# Patient Record
Sex: MALE | Race: WHITE | Employment: OTHER | URBAN - METROPOLITAN AREA
[De-identification: names, ages, dates, MRNs, and addresses within clinical notes are randomized per-mention and may not be internally consistent; named-entity substitution may affect disease eponyms.]

---

## 2017-05-03 RX ORDER — OMEPRAZOLE 20 MG/1
CAPSULE, DELAYED RELEASE ORAL
Qty: 90 CAPSULE | Refills: 3 | Status: SHIPPED | OUTPATIENT
Start: 2017-05-03 | End: 2018-05-18 | Stop reason: ALTCHOICE

## 2018-05-18 ENCOUNTER — OFFICE VISIT (OUTPATIENT)
Dept: PRIMARY CARE CLINIC | Age: 63
End: 2018-05-18
Payer: MEDICARE

## 2018-05-18 VITALS
SYSTOLIC BLOOD PRESSURE: 140 MMHG | RESPIRATION RATE: 17 BRPM | WEIGHT: 260 LBS | HEART RATE: 52 BPM | HEIGHT: 77 IN | OXYGEN SATURATION: 99 % | DIASTOLIC BLOOD PRESSURE: 80 MMHG | BODY MASS INDEX: 30.7 KG/M2

## 2018-05-18 DIAGNOSIS — R25.2 MUSCLE CRAMP: ICD-10-CM

## 2018-05-18 DIAGNOSIS — E78.00 PURE HYPERCHOLESTEROLEMIA: ICD-10-CM

## 2018-05-18 DIAGNOSIS — E55.9 VITAMIN D DEFICIENCY: ICD-10-CM

## 2018-05-18 DIAGNOSIS — I10 ESSENTIAL HYPERTENSION: Primary | ICD-10-CM

## 2018-05-18 DIAGNOSIS — R53.83 FATIGUE, UNSPECIFIED TYPE: ICD-10-CM

## 2018-05-18 DIAGNOSIS — R73.09 ABNORMAL BLOOD SUGAR: ICD-10-CM

## 2018-05-18 LAB — HBA1C MFR BLD: 5.8 %

## 2018-05-18 PROCEDURE — G8427 DOCREV CUR MEDS BY ELIG CLIN: HCPCS | Performed by: NURSE PRACTITIONER

## 2018-05-18 PROCEDURE — 3017F COLORECTAL CA SCREEN DOC REV: CPT | Performed by: NURSE PRACTITIONER

## 2018-05-18 PROCEDURE — 1036F TOBACCO NON-USER: CPT | Performed by: NURSE PRACTITIONER

## 2018-05-18 PROCEDURE — G8417 CALC BMI ABV UP PARAM F/U: HCPCS | Performed by: NURSE PRACTITIONER

## 2018-05-18 PROCEDURE — 83036 HEMOGLOBIN GLYCOSYLATED A1C: CPT | Performed by: NURSE PRACTITIONER

## 2018-05-18 PROCEDURE — 99215 OFFICE O/P EST HI 40 MIN: CPT | Performed by: NURSE PRACTITIONER

## 2018-05-18 RX ORDER — METOPROLOL SUCCINATE 50 MG/1
TABLET, EXTENDED RELEASE ORAL
Qty: 90 TABLET | Refills: 3 | Status: SHIPPED | OUTPATIENT
Start: 2018-05-18 | End: 2018-05-25 | Stop reason: CLARIF

## 2018-05-18 ASSESSMENT — ENCOUNTER SYMPTOMS
SHORTNESS OF BREATH: 0
ORTHOPNEA: 0
ABDOMINAL PAIN: 0
COUGH: 0
BACK PAIN: 1
BLURRED VISION: 0

## 2018-05-18 ASSESSMENT — PATIENT HEALTH QUESTIONNAIRE - PHQ9
1. LITTLE INTEREST OR PLEASURE IN DOING THINGS: 0
2. FEELING DOWN, DEPRESSED OR HOPELESS: 0
SUM OF ALL RESPONSES TO PHQ QUESTIONS 1-9: 0
SUM OF ALL RESPONSES TO PHQ9 QUESTIONS 1 & 2: 0

## 2018-05-21 ENCOUNTER — TELEPHONE (OUTPATIENT)
Dept: PRIMARY CARE CLINIC | Age: 63
End: 2018-05-21

## 2018-05-25 ENCOUNTER — OFFICE VISIT (OUTPATIENT)
Dept: PRIMARY CARE CLINIC | Age: 63
End: 2018-05-25
Payer: MEDICARE

## 2018-05-25 VITALS
SYSTOLIC BLOOD PRESSURE: 122 MMHG | RESPIRATION RATE: 17 BRPM | WEIGHT: 257.2 LBS | HEIGHT: 77 IN | OXYGEN SATURATION: 98 % | DIASTOLIC BLOOD PRESSURE: 74 MMHG | HEART RATE: 79 BPM | BODY MASS INDEX: 30.37 KG/M2

## 2018-05-25 DIAGNOSIS — I49.9 IRREGULAR HEART BEAT: ICD-10-CM

## 2018-05-25 DIAGNOSIS — I21.11 ST ELEVATION MYOCARDIAL INFARCTION INVOLVING RIGHT CORONARY ARTERY (HCC): Primary | ICD-10-CM

## 2018-05-25 PROCEDURE — 99214 OFFICE O/P EST MOD 30 MIN: CPT | Performed by: NURSE PRACTITIONER

## 2018-05-25 PROCEDURE — 3017F COLORECTAL CA SCREEN DOC REV: CPT | Performed by: NURSE PRACTITIONER

## 2018-05-25 PROCEDURE — 1036F TOBACCO NON-USER: CPT | Performed by: NURSE PRACTITIONER

## 2018-05-25 PROCEDURE — G8427 DOCREV CUR MEDS BY ELIG CLIN: HCPCS | Performed by: NURSE PRACTITIONER

## 2018-05-25 PROCEDURE — G8598 ASA/ANTIPLAT THER USED: HCPCS | Performed by: NURSE PRACTITIONER

## 2018-05-25 PROCEDURE — G8417 CALC BMI ABV UP PARAM F/U: HCPCS | Performed by: NURSE PRACTITIONER

## 2018-05-25 RX ORDER — ASPIRIN 81 MG/1
81 TABLET, CHEWABLE ORAL DAILY
COMMUNITY
End: 2019-09-05 | Stop reason: SDUPTHER

## 2018-05-25 RX ORDER — METOPROLOL SUCCINATE 100 MG/1
100 TABLET, EXTENDED RELEASE ORAL 2 TIMES DAILY
COMMUNITY
Start: 2018-05-22 | End: 2019-09-05 | Stop reason: SDUPTHER

## 2018-05-25 RX ORDER — TICAGRELOR 90 MG/1
90 TABLET ORAL 2 TIMES DAILY
COMMUNITY
Start: 2018-05-22 | End: 2019-09-05 | Stop reason: ALTCHOICE

## 2018-05-25 RX ORDER — ATORVASTATIN CALCIUM 80 MG/1
80 TABLET, FILM COATED ORAL DAILY
COMMUNITY
Start: 2018-05-22 | End: 2019-09-05 | Stop reason: SDUPTHER

## 2018-05-25 RX ORDER — LISINOPRIL 2.5 MG/1
2.5 TABLET ORAL DAILY
COMMUNITY
Start: 2018-05-22 | End: 2019-09-05 | Stop reason: ALTCHOICE

## 2018-05-25 ASSESSMENT — ENCOUNTER SYMPTOMS
BACK PAIN: 0
SHORTNESS OF BREATH: 0
COUGH: 0
ABDOMINAL PAIN: 0

## 2019-09-05 ENCOUNTER — OFFICE VISIT (OUTPATIENT)
Dept: PRIMARY CARE CLINIC | Age: 64
End: 2019-09-05
Payer: MEDICARE

## 2019-09-05 VITALS
DIASTOLIC BLOOD PRESSURE: 84 MMHG | HEIGHT: 76 IN | HEART RATE: 63 BPM | OXYGEN SATURATION: 97 % | SYSTOLIC BLOOD PRESSURE: 122 MMHG | BODY MASS INDEX: 33.83 KG/M2 | WEIGHT: 277.8 LBS | RESPIRATION RATE: 15 BRPM

## 2019-09-05 DIAGNOSIS — Z12.5 SCREENING FOR PROSTATE CANCER: ICD-10-CM

## 2019-09-05 DIAGNOSIS — Z13.29 SCREENING FOR THYROID DISORDER: ICD-10-CM

## 2019-09-05 DIAGNOSIS — Z13.0 SCREENING FOR DEFICIENCY ANEMIA: ICD-10-CM

## 2019-09-05 DIAGNOSIS — Z13.1 SCREENING FOR DIABETES MELLITUS: ICD-10-CM

## 2019-09-05 DIAGNOSIS — Z12.11 SCREEN FOR COLON CANCER: ICD-10-CM

## 2019-09-05 DIAGNOSIS — Z13.220 SCREENING FOR LIPID DISORDERS: ICD-10-CM

## 2019-09-05 DIAGNOSIS — R73.09 ELEVATED GLUCOSE: ICD-10-CM

## 2019-09-05 DIAGNOSIS — Z00.00 ENCOUNTER FOR GENERAL ADULT MEDICAL EXAMINATION W/O ABNORMAL FINDINGS: Primary | ICD-10-CM

## 2019-09-05 PROCEDURE — 3017F COLORECTAL CA SCREEN DOC REV: CPT | Performed by: NURSE PRACTITIONER

## 2019-09-05 PROCEDURE — G0438 PPPS, INITIAL VISIT: HCPCS | Performed by: NURSE PRACTITIONER

## 2019-09-05 RX ORDER — METOPROLOL SUCCINATE 100 MG/1
100 TABLET, EXTENDED RELEASE ORAL DAILY
Qty: 90 TABLET | Refills: 3 | Status: SHIPPED | OUTPATIENT
Start: 2019-09-05 | End: 2020-09-14

## 2019-09-05 RX ORDER — CLOPIDOGREL BISULFATE 75 MG/1
75 TABLET ORAL DAILY
Qty: 90 TABLET | Refills: 3 | Status: SHIPPED | OUTPATIENT
Start: 2019-09-05 | End: 2021-10-07 | Stop reason: SDUPTHER

## 2019-09-05 RX ORDER — ATORVASTATIN CALCIUM 80 MG/1
80 TABLET, FILM COATED ORAL DAILY
Qty: 90 TABLET | Refills: 3 | Status: SHIPPED | OUTPATIENT
Start: 2019-09-05 | End: 2020-09-29 | Stop reason: SDUPTHER

## 2019-09-05 RX ORDER — CLOPIDOGREL BISULFATE 75 MG/1
75 TABLET ORAL DAILY
COMMUNITY
End: 2019-09-05 | Stop reason: SDUPTHER

## 2019-09-05 RX ORDER — OMEPRAZOLE 40 MG/1
40 CAPSULE, DELAYED RELEASE ORAL
Qty: 30 CAPSULE | Refills: 5 | Status: SHIPPED | OUTPATIENT
Start: 2019-09-05 | End: 2020-09-29 | Stop reason: SDUPTHER

## 2019-09-05 RX ORDER — ASPIRIN 81 MG/1
81 TABLET, CHEWABLE ORAL DAILY
Qty: 90 TABLET | Refills: 3 | Status: SHIPPED | OUTPATIENT
Start: 2019-09-05

## 2019-09-05 ASSESSMENT — LIFESTYLE VARIABLES
HOW OFTEN DURING THE LAST YEAR HAVE YOU NEEDED AN ALCOHOLIC DRINK FIRST THING IN THE MORNING TO GET YOURSELF GOING AFTER A NIGHT OF HEAVY DRINKING: 0
HOW OFTEN DURING THE LAST YEAR HAVE YOU FAILED TO DO WHAT WAS NORMALLY EXPECTED FROM YOU BECAUSE OF DRINKING: 0
HOW OFTEN DO YOU HAVE SIX OR MORE DRINKS ON ONE OCCASION: 0
HAVE YOU OR SOMEONE ELSE BEEN INJURED AS A RESULT OF YOUR DRINKING: 0
HOW OFTEN DURING THE LAST YEAR HAVE YOU BEEN UNABLE TO REMEMBER WHAT HAPPENED THE NIGHT BEFORE BECAUSE YOU HAD BEEN DRINKING: 0
AUDIT-C TOTAL SCORE: 1
HOW OFTEN DURING THE LAST YEAR HAVE YOU HAD A FEELING OF GUILT OR REMORSE AFTER DRINKING: 0
HAS A RELATIVE, FRIEND, DOCTOR, OR ANOTHER HEALTH PROFESSIONAL EXPRESSED CONCERN ABOUT YOUR DRINKING OR SUGGESTED YOU CUT DOWN: 0
HOW MANY STANDARD DRINKS CONTAINING ALCOHOL DO YOU HAVE ON A TYPICAL DAY: 0
AUDIT TOTAL SCORE: 1
HOW OFTEN DURING THE LAST YEAR HAVE YOU FOUND THAT YOU WERE NOT ABLE TO STOP DRINKING ONCE YOU HAD STARTED: 0
HOW OFTEN DO YOU HAVE A DRINK CONTAINING ALCOHOL: 1

## 2019-09-05 ASSESSMENT — PATIENT HEALTH QUESTIONNAIRE - PHQ9
SUM OF ALL RESPONSES TO PHQ QUESTIONS 1-9: 0
SUM OF ALL RESPONSES TO PHQ QUESTIONS 1-9: 0

## 2019-09-05 ASSESSMENT — ANXIETY QUESTIONNAIRES: GAD7 TOTAL SCORE: 0

## 2019-09-18 ENCOUNTER — TELEPHONE (OUTPATIENT)
Dept: PRIMARY CARE CLINIC | Age: 64
End: 2019-09-18

## 2019-09-18 DIAGNOSIS — M54.42 CHRONIC MIDLINE LOW BACK PAIN WITH BILATERAL SCIATICA: Primary | ICD-10-CM

## 2019-09-18 DIAGNOSIS — G89.29 CHRONIC MIDLINE LOW BACK PAIN WITH BILATERAL SCIATICA: Primary | ICD-10-CM

## 2019-09-18 DIAGNOSIS — M54.41 CHRONIC MIDLINE LOW BACK PAIN WITH BILATERAL SCIATICA: Primary | ICD-10-CM

## 2020-07-19 LAB
BASOPHILS ABSOLUTE: 0.1 /ΜL
BASOPHILS RELATIVE PERCENT: 1 %
EOSINOPHILS ABSOLUTE: 0.2 /ΜL
EOSINOPHILS RELATIVE PERCENT: 2 %
FERRITIN: 25 NG/ML (ref 18–300)
HCT VFR BLD CALC: 36.5 % (ref 41–53)
HEMOGLOBIN: 12.7 G/DL (ref 13.5–17.5)
LYMPHOCYTES ABSOLUTE: 8.1 /ΜL
LYMPHOCYTES RELATIVE PERCENT: 60 %
MCH RBC QN AUTO: 31.3 PG
MCHC RBC AUTO-ENTMCNC: 34.9 G/DL
MCV RBC AUTO: 90 FL
MONOCYTES ABSOLUTE: 0.6 /ΜL
MONOCYTES RELATIVE PERCENT: 5 %
NEUTROPHILS ABSOLUTE: 4.4 /ΜL
NEUTROPHILS RELATIVE PERCENT: 33 %
PLATELET # BLD: 239 K/ΜL
PMV BLD AUTO: 7.9 FL
RBC # BLD: 4.07 10^6/ΜL
WBC # BLD: 13.4 10^3/ML

## 2020-09-14 RX ORDER — METOPROLOL SUCCINATE 100 MG/1
TABLET, EXTENDED RELEASE ORAL
Qty: 30 TABLET | Refills: 0 | Status: SHIPPED | OUTPATIENT
Start: 2020-09-14 | End: 2020-11-24

## 2020-09-29 RX ORDER — OMEPRAZOLE 40 MG/1
40 CAPSULE, DELAYED RELEASE ORAL
Qty: 30 CAPSULE | Refills: 0 | Status: SHIPPED | OUTPATIENT
Start: 2020-09-29 | End: 2020-10-29

## 2020-09-29 RX ORDER — ATORVASTATIN CALCIUM 80 MG/1
80 TABLET, FILM COATED ORAL DAILY
Qty: 30 TABLET | Refills: 0 | Status: SHIPPED | OUTPATIENT
Start: 2020-09-29 | End: 2020-10-29

## 2020-10-05 ENCOUNTER — OFFICE VISIT (OUTPATIENT)
Dept: PRIMARY CARE CLINIC | Age: 65
End: 2020-10-05
Payer: MEDICARE

## 2020-10-05 VITALS
HEIGHT: 77 IN | BODY MASS INDEX: 36.01 KG/M2 | SYSTOLIC BLOOD PRESSURE: 136 MMHG | WEIGHT: 305 LBS | DIASTOLIC BLOOD PRESSURE: 84 MMHG | OXYGEN SATURATION: 96 % | HEART RATE: 73 BPM | RESPIRATION RATE: 15 BRPM

## 2020-10-05 PROCEDURE — G0008 ADMIN INFLUENZA VIRUS VAC: HCPCS | Performed by: NURSE PRACTITIONER

## 2020-10-05 PROCEDURE — 90686 IIV4 VACC NO PRSV 0.5 ML IM: CPT | Performed by: NURSE PRACTITIONER

## 2020-10-05 PROCEDURE — G8482 FLU IMMUNIZE ORDER/ADMIN: HCPCS | Performed by: NURSE PRACTITIONER

## 2020-10-05 PROCEDURE — 3017F COLORECTAL CA SCREEN DOC REV: CPT | Performed by: NURSE PRACTITIONER

## 2020-10-05 PROCEDURE — G0439 PPPS, SUBSEQ VISIT: HCPCS | Performed by: NURSE PRACTITIONER

## 2020-10-05 ASSESSMENT — LIFESTYLE VARIABLES
HOW OFTEN DURING THE LAST YEAR HAVE YOU BEEN UNABLE TO REMEMBER WHAT HAPPENED THE NIGHT BEFORE BECAUSE YOU HAD BEEN DRINKING: 0
AUDIT TOTAL SCORE: 3
HOW OFTEN DURING THE LAST YEAR HAVE YOU HAD A FEELING OF GUILT OR REMORSE AFTER DRINKING: 0
HOW OFTEN DO YOU HAVE SIX OR MORE DRINKS ON ONE OCCASION: 0
HOW OFTEN DURING THE LAST YEAR HAVE YOU NEEDED AN ALCOHOLIC DRINK FIRST THING IN THE MORNING TO GET YOURSELF GOING AFTER A NIGHT OF HEAVY DRINKING: 0
HOW OFTEN DURING THE LAST YEAR HAVE YOU FAILED TO DO WHAT WAS NORMALLY EXPECTED FROM YOU BECAUSE OF DRINKING: 0
HAS A RELATIVE, FRIEND, DOCTOR, OR ANOTHER HEALTH PROFESSIONAL EXPRESSED CONCERN ABOUT YOUR DRINKING OR SUGGESTED YOU CUT DOWN: 0
HOW OFTEN DURING THE LAST YEAR HAVE YOU FOUND THAT YOU WERE NOT ABLE TO STOP DRINKING ONCE YOU HAD STARTED: 0
AUDIT-C TOTAL SCORE: 3
HOW MANY STANDARD DRINKS CONTAINING ALCOHOL DO YOU HAVE ON A TYPICAL DAY: 1
HOW OFTEN DO YOU HAVE A DRINK CONTAINING ALCOHOL: 2
HAVE YOU OR SOMEONE ELSE BEEN INJURED AS A RESULT OF YOUR DRINKING: 0

## 2020-10-05 ASSESSMENT — PATIENT HEALTH QUESTIONNAIRE - PHQ9
SUM OF ALL RESPONSES TO PHQ9 QUESTIONS 1 & 2: 0
2. FEELING DOWN, DEPRESSED OR HOPELESS: 0
SUM OF ALL RESPONSES TO PHQ QUESTIONS 1-9: 0
SUM OF ALL RESPONSES TO PHQ QUESTIONS 1-9: 0
1. LITTLE INTEREST OR PLEASURE IN DOING THINGS: 0

## 2020-10-05 NOTE — PROGRESS NOTES
Medicare Annual Wellness Visit  Name: Iam Perkins Date: 10/5/2020   MRN: [id number] Sex: Male   Age: 60 y.o. Ethnicity: Non-/Non    : 1955 Race: Joaquimnory Doyle is here for Medicare AWV    Screenings for behavioral, psychosocial and functional/safety risks, and cognitive dysfunction are all negative except as indicated below. These results, as well as other patient data from the [address] form, are documented in Flowsheets linked to this Encounter. No Known Allergies    Prior to Visit Medications    Medication Sig Taking? Authorizing Provider   atorvastatin (LIPITOR) 80 MG tablet Take 1 tablet by mouth daily Yes ANCA Reynolds CNP   omeprazole (PRILOSEC) 40 MG delayed release capsule Take 1 capsule by mouth every morning (before breakfast) Yes ANCA Reynolds CNP   rivaroxaban (XARELTO) 20 MG TABS tablet Take 1 tablet by mouth Daily with supper Yes ANCA Reynolds CNP   metoprolol succinate (TOPROL XL) 100 MG extended release tablet take 1 tablet by mouth once daily Yes ANCA Reynolds CNP   clopidogrel (PLAVIX) 75 MG tablet Take 1 tablet by mouth daily Yes ANCA Reynolds CNP   aspirin 81 MG chewable tablet Take 1 tablet by mouth daily Yes ANCA Reynolds CNP   Multiple Vitamins-Minerals (MULTIVITAMIN PO) Take 1 tablet by mouth daily.  Yes Historical Provider, MD       Past Medical History:   Diagnosis Date    Arthritis     Chronic back pain     Chronic pain     Colon polyps     Degenerative disk disease     Failure of spinal fusion     Fractures     Hx multiple fractures:  bilat ankles, Ribs x3, Nose, Collar bone, fingers, toes thumb    Hepatitis B     Hyperlipidemia     ON RX    Hypertension 2001    ON RX    Irregular heart beat 2012    Neuropathy     BILAT LEGS    Spinal stenosis        Past Surgical History:   Procedure Laterality Date    BACK SURGERY  2012    Decompressive laminectomy/ medial w/ medial fecetectomies    BACK SURGERY  1/7/11    Decompressive laminectomy/ lumbar fusion w/ grafts and hardware.  BACK SURGERY  8/3/09    Anterior approach L4-5 spinal fusion    BACK SURGERY  10/1/08    Hemilaminectomy , decompression, discectomy L4-5, S1    COLONOSCOPY  8/1/2014    resection of polyps    NERVE BLOCK  6-5-13    DURAMORPH EPIDURAL STEROID BLOCK  CELESTONE 6 MORPHINE 1.5 MG    TENDON MANIPULATION Left 1966    Tendon & laceration repair forearm    TONSILLECTOMY  1965       Family History   Problem Relation Age of Onset    Heart Attack Father         passed away of massive MI @ 40    Heart Disease Father 40        MI'S AND OPEN HEART    Cancer Mother         age 70, lung CA    Cancer Maternal Uncle         PROSTATE    Heart Disease Paternal Aunt         OPEN HEART    Cancer Maternal Grandmother     Heart Disease Paternal Grandfather 40        MI       CareTeam (Including outside providers/suppliers regularly involved in providing care):   Patient Care Team:  ANCA Hancock Ala, CNP as PCP - General (Nurse Practitioner)  ANCA Hancock Ala, CNP as PCP - [facility] Empaneled Provider  Iker Hull RN as Registered Nurse    Wt Readings from Last 3 Encounters:   10/05/20 (!) 305 lb (138.3 kg)   09/05/19 277 lb 12.8 oz (126 kg)   05/25/18 257 lb 3.2 oz (116.7 kg)     Vitals:    10/05/20 0905   BP: 136/84   Pulse: 73   Resp: 15   SpO2: 96%   Weight: (!) 305 lb (138.3 kg)   Height: 6' 5\" (1.956 m)     Body mass index is 36.17 kg/m². Based upon direct observation of the patient, evaluation of cognition reveals recent and remote memory intact.     General Appearance: alert and oriented to person, place and time, well developed and well- nourished, in no acute distress  Skin: warm and dry, no rash or erythema  Head: normocephalic and atraumatic  Eyes: pupils equal, round, and reactive to light, extraocular eye movements intact, conjunctivae normal  ENT: tympanic membrane, external ear and ear canal normal bilaterally, nose without deformity, nasal mucosa and turbinates normal without polyps  Neck: supple and non-tender without mass, no thyromegaly or thyroid nodules, no cervical lymphadenopathy  Pulmonary/Chest: clear to auscultation bilaterally- no wheezes, rales or rhonchi, normal air movement, no respiratory distress  Cardiovascular: normal rate, regular rhythm, normal S1 and S2, no murmurs, rubs, clicks, or gallops, distal pulses intact, no carotid bruits  Abdomen: soft, non-tender, non-distended, normal bowel sounds, no masses or organomegaly  Extremities: no cyanosis, clubbing or edema  Musculoskeletal: normal range of motion, no joint swelling, deformity or tenderness  Neurologic: reflexes normal and symmetric, no cranial nerve deficit, gait, coordination and speech normal    Patient's complete Health Risk Assessment and screening values have been reviewed and are found in Flowsheets. The following problems were reviewed today and where indicated follow up appointments were made and/or referrals ordered. Positive Risk Factor Screenings with Interventions:     General Health:  General  In general, how would you say your health is?: Fair  In the past 7 days, have you experienced any of the following? New or Increased Pain, New or Increased Fatigue, Loneliness, Social Isolation, Stress or Anger?: (!) New or Increased Pain  Do you get the social and emotional support that you need?: Yes  Do you have a Living Will?: (!) No  General Health Risk Interventions:  · No Living Will: patient declined    Health Habits/Nutrition:  Health Habits/Nutrition  Do you exercise for at least 20 minutes 2-3 times per week?: Yes  Have you lost any weight without trying in the past 3 months?: No  Do you eat fewer than 2 meals per day?: No  Have you seen a dentist within the past year?: (!) No  Body mass index is 36.17 kg/m².   Health Habits/Nutrition Interventions:  · Dental exam overdue: awv.    Diagnoses and all orders for this visit:    Encounter for general adult medical examination w/o abnormal findings    Need for influenza vaccination  -     INFLUENZA, QUADV, 3 YRS AND OLDER, IM PF, PREFILL SYR OR SDV, 0.5ML (KAREN Ayala)    Screening for diabetes mellitus  -     Comprehensive Metabolic Panel; Future    Screening for deficiency anemia  -     CBC; Future    Hyperlipidemia, unspecified hyperlipidemia type  -     Lipid Panel; Future    Screening for thyroid disorder  -     TSH with Reflex; Future    Screening for prostate cancer  -     Psa screening; Future    Current use of proton pump inhibitor  -     Magnesium; Future    Acute pain of right shoulder  -     XR SHOULDER RIGHT (MIN 2 VIEWS); Future    Epigastric pain  -     XR ABDOMEN (2 VIEWS);  Future        Presents for AWV  Has gained 28lb since last visit  Feels it is due to his chronic back pain and not being able to move as well  Saw Dr. Cisneros Forward in the past for neurosurgery before care home, declines referral for further eval at this time    C/o right shoulder pain x 3 months with mass to right shoulder  Denies mechanism of injury  Willing to update xray    C/o epigastric abdominal pain, worse over the last 3 months  Hx of hernia  Stabbing pain every morning  Taking prilosec without improvement, does not feel it is GERD    Updated flu vaccine  Willing to update annual labs    Denies any other problems/concerns  Follow up in six months for recheck

## 2020-10-22 ENCOUNTER — HOSPITAL ENCOUNTER (OUTPATIENT)
Age: 65
Discharge: HOME | End: 2020-10-22
Payer: MEDICARE

## 2020-10-22 ENCOUNTER — HOSPITAL ENCOUNTER (OUTPATIENT)
Dept: GENERAL RADIOLOGY | Age: 65
Discharge: HOME | End: 2020-10-24
Payer: MEDICARE

## 2020-10-22 ENCOUNTER — HOSPITAL ENCOUNTER (OUTPATIENT)
Age: 65
Discharge: HOME | End: 2020-10-24
Payer: MEDICARE

## 2020-10-22 LAB
ALBUMIN SERPL-MCNC: 4.5 G/DL (ref 3.5–5.2)
ALBUMIN/GLOBULIN RATIO: 1.7 (ref 1–2.5)
ALP BLD-CCNC: 100 U/L (ref 40–129)
ALT SERPL-CCNC: 16 U/L (ref 5–41)
ANION GAP SERPL CALCULATED.3IONS-SCNC: 14 MMOL/L (ref 9–17)
AST SERPL-CCNC: 19 U/L
BILIRUB SERPL-MCNC: 0.65 MG/DL (ref 0.3–1.2)
BUN BLDV-MCNC: 16 MG/DL (ref 8–23)
BUN/CREAT BLD: NORMAL (ref 9–20)
CALCIUM SERPL-MCNC: 9.3 MG/DL (ref 8.6–10.4)
CHLORIDE BLD-SCNC: 103 MMOL/L (ref 98–107)
CHOLESTEROL/HDL RATIO: 3.9
CHOLESTEROL: 168 MG/DL
CO2: 21 MMOL/L (ref 20–31)
CREAT SERPL-MCNC: 0.85 MG/DL (ref 0.7–1.2)
GFR AFRICAN AMERICAN: >60 ML/MIN
GFR NON-AFRICAN AMERICAN: >60 ML/MIN
GFR SERPL CREATININE-BSD FRML MDRD: NORMAL ML/MIN/{1.73_M2}
GFR SERPL CREATININE-BSD FRML MDRD: NORMAL ML/MIN/{1.73_M2}
GLUCOSE BLD-MCNC: 99 MG/DL (ref 70–99)
HCT VFR BLD CALC: 41.8 % (ref 40.7–50.3)
HDLC SERPL-MCNC: 43 MG/DL
HEMOGLOBIN: 13.9 G/DL (ref 13–17)
LDL CHOLESTEROL: 81 MG/DL (ref 0–130)
MAGNESIUM: 2.2 MG/DL (ref 1.6–2.6)
MCH RBC QN AUTO: 30.2 PG (ref 25.2–33.5)
MCHC RBC AUTO-ENTMCNC: 33.3 G/DL (ref 28.4–34.8)
MCV RBC AUTO: 90.7 FL (ref 82.6–102.9)
NRBC AUTOMATED: 0 PER 100 WBC
PDW BLD-RTO: 12.9 % (ref 11.8–14.4)
PLATELET # BLD: 247 K/UL (ref 138–453)
PMV BLD AUTO: 10.6 FL (ref 8.1–13.5)
POTASSIUM SERPL-SCNC: 4.5 MMOL/L (ref 3.7–5.3)
PROSTATE SPECIFIC ANTIGEN: 1.09 UG/L
RBC # BLD: 4.61 M/UL (ref 4.21–5.77)
SODIUM BLD-SCNC: 138 MMOL/L (ref 135–144)
TOTAL PROTEIN: 7.2 G/DL (ref 6.4–8.3)
TRIGL SERPL-MCNC: 219 MG/DL
TSH SERPL DL<=0.05 MIU/L-ACNC: 0.75 MIU/L (ref 0.3–5)
VLDLC SERPL CALC-MCNC: ABNORMAL MG/DL (ref 1–30)
WBC # BLD: 13.9 K/UL (ref 3.5–11.3)

## 2020-10-22 PROCEDURE — 80053 COMPREHEN METABOLIC PANEL: CPT

## 2020-10-22 PROCEDURE — 85027 COMPLETE CBC AUTOMATED: CPT

## 2020-10-22 PROCEDURE — 80061 LIPID PANEL: CPT

## 2020-10-22 PROCEDURE — 73030 X-RAY EXAM OF SHOULDER: CPT

## 2020-10-22 PROCEDURE — 74019 RADEX ABDOMEN 2 VIEWS: CPT

## 2020-10-22 PROCEDURE — 83735 ASSAY OF MAGNESIUM: CPT

## 2020-10-22 PROCEDURE — 36415 COLL VENOUS BLD VENIPUNCTURE: CPT

## 2020-10-22 PROCEDURE — 84443 ASSAY THYROID STIM HORMONE: CPT

## 2020-10-22 PROCEDURE — G0103 PSA SCREENING: HCPCS

## 2020-10-22 RX ORDER — ESOMEPRAZOLE MAGNESIUM 40 MG/1
40 CAPSULE, DELAYED RELEASE ORAL 2 TIMES DAILY
Qty: 28 CAPSULE | Refills: 0 | Status: SHIPPED | OUTPATIENT
Start: 2020-10-22 | End: 2021-03-19

## 2020-10-28 ENCOUNTER — TELEPHONE (OUTPATIENT)
Dept: PRIMARY CARE CLINIC | Age: 65
End: 2020-10-28

## 2020-10-28 NOTE — TELEPHONE ENCOUNTER
Outcome   Deniedtoday   Your request has been denied      Writer Waiting for letter from insurance to see what medications are covered.

## 2020-10-29 RX ORDER — OMEPRAZOLE 40 MG/1
CAPSULE, DELAYED RELEASE ORAL
Qty: 90 CAPSULE | Refills: 3 | Status: SHIPPED | OUTPATIENT
Start: 2020-10-29 | End: 2021-10-07 | Stop reason: SDUPTHER

## 2020-10-29 RX ORDER — RIVAROXABAN 20 MG/1
TABLET, FILM COATED ORAL
Qty: 30 TABLET | Refills: 0 | Status: SHIPPED | OUTPATIENT
Start: 2020-10-29 | End: 2020-12-10

## 2020-10-29 RX ORDER — ATORVASTATIN CALCIUM 80 MG/1
80 TABLET, FILM COATED ORAL DAILY
Qty: 90 TABLET | Refills: 3 | Status: SHIPPED | OUTPATIENT
Start: 2020-10-29 | End: 2021-10-07 | Stop reason: SDUPTHER

## 2020-11-04 ENCOUNTER — OFFICE VISIT (OUTPATIENT)
Dept: ORTHOPEDIC SURGERY | Age: 65
End: 2020-11-04
Payer: MEDICARE

## 2020-11-04 VITALS — BODY MASS INDEX: 35.42 KG/M2 | HEIGHT: 77 IN | WEIGHT: 300 LBS

## 2020-11-04 PROBLEM — M19.011 OSTEOARTHRITIS OF RIGHT ACROMIOCLAVICULAR JOINT: Status: ACTIVE | Noted: 2020-11-04

## 2020-11-04 PROCEDURE — G8417 CALC BMI ABV UP PARAM F/U: HCPCS | Performed by: ORTHOPAEDIC SURGERY

## 2020-11-04 PROCEDURE — G8427 DOCREV CUR MEDS BY ELIG CLIN: HCPCS | Performed by: ORTHOPAEDIC SURGERY

## 2020-11-04 PROCEDURE — 1036F TOBACCO NON-USER: CPT | Performed by: ORTHOPAEDIC SURGERY

## 2020-11-04 PROCEDURE — 99203 OFFICE O/P NEW LOW 30 MIN: CPT | Performed by: ORTHOPAEDIC SURGERY

## 2020-11-04 PROCEDURE — G8482 FLU IMMUNIZE ORDER/ADMIN: HCPCS | Performed by: ORTHOPAEDIC SURGERY

## 2020-11-04 PROCEDURE — 3017F COLORECTAL CA SCREEN DOC REV: CPT | Performed by: ORTHOPAEDIC SURGERY

## 2020-11-04 NOTE — LETTER
11/4/2020    Christina Holder, APRN - CNP  [address]  [place], [address] [facility]    RE: Antonino Cisneros    Dear Dr. Keira Richardson,    Thank you for allowing me to participate in the care of Mr. Rosalia Rock. I had the opportunity to evaluate the patient on 11/4/2020. Attached you will find my evaluation and recommendations. Thanks again for the confidence you have expressed in me by allowing my participation in the care of your patient. I will keep you apprised of further developments in the patients treatment course as it progresses. If I can be of further assistance in any fashion, please feel free to contact me at your convenience.     Sincerely,        Lobo Hathaway  Shoulder and Elbow Surgery

## 2020-11-04 NOTE — PROGRESS NOTES
Orthopedic Shoulder Encounter Note     Chief complaint: Right shoulder pain    HPI: Yo Lechuga is a 63 y.o.  right-hand dominant male who presents for evaluation of his right shoulder. He has been having pain on and off over the course of several years. 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 Over the past 2 months he has had fairly constant aggravating pain in his shoulder which he describes as an ache. He states that is unable to put his arm behind his back or reach away from his body without having significant pain. Recently is also noticed a lump over the superior aspect of the shoulder. His pain is otherwise diffuse about the shoulder. He denies having any associated weakness or stiffness. He has not attempted any treatment for this. Previous treatment:    NSAIDs: None    Physical Therapy:  No    Injections: None    Surgeries: None    Review of Systems:     Constitution: no fever or chills   Pain level: 6/10  Musculoskeletal: As noted in the HPI   Neurologic: no neurologic symptoms    Past Medical History  Fernie Domínguez  has a past medical history of Arthritis, Chronic back pain, Chronic pain, Colon polyps, Degenerative disk disease, Failure of spinal fusion, Fractures, Hepatitis B, Hyperlipidemia, Hypertension, Irregular heart beat, Neuropathy, and Spinal stenosis. Past Surgical History  Fernie Domínguez  has a past surgical history that includes Tonsillectomy (1965); tendon manipulation (Left, 1966); Nerve Block (6-5-13); back surgery (7/2012); back surgery (1/7/11); back surgery (8/3/09); back surgery (10/1/08); and Colonoscopy (8/1/2014).     Current Medications  Current Outpatient Medications   Medication Sig Dispense Refill    atorvastatin (LIPITOR) 80 MG tablet TAKE 1 TABLET BY MOUTH DAILY 90 tablet 3    omeprazole (PRILOSEC) 40 MG delayed release capsule TAKE 1 CAPSULE BY MOUTH EVERY MORNING BEFORE BREAKFAST 90 capsule 3    XARELTO 20 MG TABS tablet TAKE 1 TABLET BY MOUTH DAILY WITH SUPPER 30 tablet 0    dexlansoprazole (DEXILANT) 30 MG CPDR delayed release capsule Take 30 mg by mouth daily 90 capsule 1    esomeprazole (NEXIUM) 40 MG delayed release capsule Take 1 capsule by mouth 2 times daily for 14 days 28 capsule 0    metoprolol succinate (TOPROL XL) 100 MG extended release tablet take 1 tablet by mouth once daily 30 tablet 0    clopidogrel (PLAVIX) 75 MG tablet Take 1 tablet by mouth daily 90 tablet 3    aspirin 81 MG chewable tablet Take 1 tablet by mouth daily 90 tablet 3    Multiple Vitamins-Minerals (MULTIVITAMIN PO) Take 1 tablet by mouth daily. No current facility-administered medications for this visit. Allergies  Allergies have been reviewed. Gabriela Husain has No Known Allergies. Social History  Gabriela Husain  reports that he has never smoked. He has never used smokeless tobacco. He reports current alcohol use of about 6.0 standard drinks of alcohol per week. He reports that he does not use drugs. Family History  Tye's family history includes Cancer in his maternal grandmother, maternal uncle, and mother; Heart Attack in his father; Heart Disease in his paternal aunt; Heart Disease (age of onset: 40) in his father and paternal grandfather. Physical Exam:     Ht 6' 5\" (1.956 m)   Wt 300 lb (136.1 kg)   BMI 35.57 kg/m²    General Appearance: alert, well appearing, and in no distress  Mental Status: alert, oriented to person, place, and time  Gait: normal    Shoulder:    Skin: warm and dry, no rash or erythema; no swelling or obvious muscular atrophy. Round mass over the superior aspect of the right shoulder at about the level of the acromioclavicular joint. It is relatively firm but mobile.   It is nonpulsatile  Vasculature: 2+ radial pulses bilaterally  Neuro: Sensation grossly intact to light touch diffusely  Tenderness: Tender to palpation over the acromioclavicular joint and anterior aspect of the right shoulder    ROM: (Degrees)    Right   A P   Left   A P    Elevation  135 150   Elevation  150   Abduction  150 155   Abduction  155    ER   50 80   ER   55   IR   L4    IR   T12   90 abd/ER      90 abd/ER     90 abd/IR      90 abd/IR     Crepitation  No    Crepitation No  Dyskenesia  No    Dyskenesia No      Muscle strength:    Right       Left    Deltoid   5    Deltoid   5  Supraspinatus  5    Supraspinatus  5  ER   5    ER   5  IR   5    IR   5    Special tests    Right   Rotator Cuff    Left    y   Painful arc    n   n   Pain with ER    n    n   Neer's     n    n   Hawkin's    n    n   Drop Arm    n  n   Lift off/Belly Press   n  n   ER Lag    n          AC Joint  y   AC tenderness   n  y   Cross-chest adduction  n       Labrum/biceps    y (equivocal)  Wyandot's    n   n   Biceps sheer    n      y   Speed's/Yergason's   n    y   Tenderness Biceps Groove  n    n   Foreign's    n         Instability  n   Ant Apprehension   n    n   Post Apprehension   n    n   Ant Load shift    n    n   Post Load shift   n   n   Sulcus     n  n   Generalized Laxity   n  n   Relocation test   n  n   Crank test     n  n   Ron-superior escape  n       Imaging:  Xrays: 4 views of the right shoulder obtained on 11/4/2020 were independently reviewed  Indications: Right shoulder pain  Findings: Normal glenohumeral joint space. Significant narrowing of the acromioclavicular joint associated with osteophytic change. Type II acromion. No obvious fracture, dislocation, or subluxation. 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 old male with right shoulder pain likely mostly due to his significant degeneration at the acromioclavicular joint. He has an associated ganglion cyst emanating from this joint.   I had a discussion educating him about this problem and we discussed treatment options including nonoperative and

## 2020-11-16 ENCOUNTER — OFFICE VISIT (OUTPATIENT)
Dept: ORTHOPEDIC SURGERY | Age: 65
End: 2020-11-16
Payer: MEDICARE

## 2020-11-16 VITALS
HEART RATE: 77 BPM | WEIGHT: 300 LBS | HEIGHT: 77 IN | DIASTOLIC BLOOD PRESSURE: 87 MMHG | BODY MASS INDEX: 35.42 KG/M2 | TEMPERATURE: 97.3 F | SYSTOLIC BLOOD PRESSURE: 144 MMHG

## 2020-11-16 PROCEDURE — 3017F COLORECTAL CA SCREEN DOC REV: CPT | Performed by: FAMILY MEDICINE

## 2020-11-16 PROCEDURE — 99203 OFFICE O/P NEW LOW 30 MIN: CPT | Performed by: FAMILY MEDICINE

## 2020-11-16 PROCEDURE — 20610 DRAIN/INJ JOINT/BURSA W/O US: CPT | Performed by: FAMILY MEDICINE

## 2020-11-16 PROCEDURE — 1036F TOBACCO NON-USER: CPT | Performed by: FAMILY MEDICINE

## 2020-11-16 PROCEDURE — G8482 FLU IMMUNIZE ORDER/ADMIN: HCPCS | Performed by: FAMILY MEDICINE

## 2020-11-16 PROCEDURE — G8427 DOCREV CUR MEDS BY ELIG CLIN: HCPCS | Performed by: FAMILY MEDICINE

## 2020-11-16 PROCEDURE — G8417 CALC BMI ABV UP PARAM F/U: HCPCS | Performed by: FAMILY MEDICINE

## 2020-11-16 RX ORDER — TRIAMCINOLONE ACETONIDE 40 MG/ML
40 INJECTION, SUSPENSION INTRA-ARTICULAR; INTRAMUSCULAR ONCE
Status: COMPLETED | OUTPATIENT
Start: 2020-11-16 | End: 2020-11-16

## 2020-11-16 RX ORDER — BUPIVACAINE HYDROCHLORIDE 5 MG/ML
1 INJECTION, SOLUTION PERINEURAL ONCE
Status: COMPLETED | OUTPATIENT
Start: 2020-11-16 | End: 2020-11-16

## 2020-11-16 RX ADMIN — TRIAMCINOLONE ACETONIDE 40 MG: 40 INJECTION, SUSPENSION INTRA-ARTICULAR; INTRAMUSCULAR at 10:32

## 2020-11-16 RX ADMIN — BUPIVACAINE HYDROCHLORIDE 5 MG: 5 INJECTION, SOLUTION PERINEURAL at 10:32

## 2020-11-16 NOTE — PROGRESS NOTES
Sports Medicine Consultation    CHIEF COMPLAINT:  Shoulder Pain (Rt shoulder. no injury. years of pain worse over last couple months.)        HPI:   The patient is a 63 y.o. male who is being seen as a new patient being seen for regarding new problem r shoulder pain. The patient is a right hand dominant male who has had shoulder pain for months. As far as trauma to the shoulder, the patient indicates no one specific trauma. The pain is  worse at night and when doing overhead activities. Weakness of the shoulder has  been noted. The pain restricts activities such as just hurts. The pain does not seem to improve with time. The following medications have been tried: nothing without benefit. Physical Therapy has not been tried. Corticosteroid injection has not been done. Neck pain has not been present. he has a past medical history of Arthritis, Chronic back pain, Chronic pain, Colon polyps, Degenerative disk disease, Failure of spinal fusion, Fractures, Hepatitis B, Hyperlipidemia, Hypertension, Irregular heart beat, Neuropathy, and Spinal stenosis. he has a past surgical history that includes Tonsillectomy (1965); tendon manipulation (Left, 1966); Nerve Block (6-5-13); back surgery (7/2012); back surgery (1/7/11); back surgery (8/3/09); back surgery (10/1/08); and Colonoscopy (8/1/2014).     Past Medical History:   Diagnosis Date    Arthritis     Chronic back pain     Chronic pain     Colon polyps     Degenerative disk disease     Failure of spinal fusion     Fractures     Hx multiple fractures:  bilat ankles, Ribs x3, Nose, Collar bone, fingers, toes thumb    Hepatitis B     Hyperlipidemia     ON RX    Hypertension 2001    ON RX    Irregular heart beat 2012    Neuropathy     BILAT LEGS    Spinal stenosis        Past Surgical History:   Procedure Laterality Date    BACK SURGERY  7/2012    Decompressive laminectomy/ medial w/ medial fecetectomies    BACK SURGERY  1/7/11 Decompressive laminectomy/ lumbar fusion w/ grafts and hardware.  BACK SURGERY  8/3/09    Anterior approach L4-5 spinal fusion    BACK SURGERY  10/1/08    Hemilaminectomy , decompression, discectomy L4-5, S1    COLONOSCOPY  8/1/2014    resection of polyps    NERVE BLOCK  6-5-13    DURAMORPH EPIDURAL STEROID BLOCK  CELESTONE 6 MORPHINE 1.5 MG    TENDON MANIPULATION Left 1966    Tendon & laceration repair forearm    TONSILLECTOMY  1965       family history includes Cancer in his maternal grandmother, maternal uncle, and mother; Heart Attack in his father; Heart Disease in his paternal aunt; Heart Disease (age of onset: 40) in his father and paternal grandfather. Social History     Socioeconomic History    Marital status: Single     Spouse name: Not on file    Number of children: Not on file    Years of education: Not on file    Highest education level: Not on file   Occupational History    Not on file   Social Needs    Financial resource strain: Not on file    Food insecurity     Worry: Not on file     Inability: Not on file    Transportation needs     Medical: Not on file     Non-medical: Not on file   Tobacco Use    Smoking status: Never Smoker    Smokeless tobacco: Never Used   Substance and Sexual Activity    Alcohol use:  Yes     Alcohol/week: 6.0 standard drinks     Types: 6 Cans of beer per week     Comment: usually weekends    Drug use: No    Sexual activity: Not on file   Lifestyle    Physical activity     Days per week: Not on file     Minutes per session: Not on file    Stress: Not on file   Relationships    Social connections     Talks on phone: Not on file     Gets together: Not on file     Attends [employer] service: Not on file     Active member of club or organization: Not on file     Attends meetings of clubs or organizations: Not on file     Relationship status: Not on file    Intimate partner violence     Fear of current or ex partner: Not on file     Emotionally abused: Not on file     Physically abused: Not on file     Forced sexual activity: Not on file   Other Topics Concern    Not on file   Social History Narrative    Not on file       Current Outpatient Medications   Medication Sig Dispense Refill    atorvastatin (LIPITOR) 80 MG tablet TAKE 1 TABLET BY MOUTH DAILY 90 tablet 3    omeprazole (PRILOSEC) 40 MG delayed release capsule TAKE 1 CAPSULE BY MOUTH EVERY MORNING BEFORE BREAKFAST 90 capsule 3    XARELTO 20 MG TABS tablet TAKE 1 TABLET BY MOUTH DAILY WITH SUPPER 30 tablet 0    dexlansoprazole (DEXILANT) 30 MG CPDR delayed release capsule Take 30 mg by mouth daily 90 capsule 1    esomeprazole (NEXIUM) 40 MG delayed release capsule Take 1 capsule by mouth 2 times daily for 14 days 28 capsule 0    metoprolol succinate (TOPROL XL) 100 MG extended release tablet take 1 tablet by mouth once daily 30 tablet 0    clopidogrel (PLAVIX) 75 MG tablet Take 1 tablet by mouth daily 90 tablet 3    aspirin 81 MG chewable tablet Take 1 tablet by mouth daily 90 tablet 3    Multiple Vitamins-Minerals (MULTIVITAMIN PO) Take 1 tablet by mouth daily. Current Facility-Administered Medications   Medication Dose Route Frequency Provider Last Rate Last Dose    bupivacaine (MARCAINE) 0.5 % injection 5 mg  1 mL Intra-articular Once Elane Lax, DO        triamcinolone acetonide (KENALOG-40) injection 40 mg  40 mg Intra-articular Once Elane Lax, DO             Allergies:  hehas No Known Allergies. ROS:  CV:  Denies chest pain; palpitations; shortness of breath; swelling of feet, ankles; and loss of consciousness. CON: Denies fever and dizziness. ENT:  Denies hearing loss / ringing, ear infections hoarseness, and swallowing problems. RESP:  Denies chronic cough, spitting up blood, and asthma/wheezing. GI: Denies abdominal pain, change in bowel habits, nausea or vomiting, and blood in stools.   :  Denies frequent urination, burning or painful urination, blood in the urine, anti-inflammatory medications, physical therapy, injections, further imaging studies and as a last resort surgery. At this point I do think the patient would benefit from attempted aspiration of this ganglion cyst would help with the acromioclavicular joint and after RBP was discussed verbal consent was obtained in the right shoulder acromioclavicular joint was visualized in the ultrasound with image capture to be uploaded into the electronic medical record I then introduced an 18-gauge needle into the cystic structure and after needle confirmation of placement I aspirated 2 mL of thick serosanguineous fluid from that cyst I then switched out the syringe redirected the needle into the acromioclavicular joint with image capture of needle placement and visualization of vascular structures injected 1 mL 40 mg Kenalog and 1 mL 0.5% Marcaine into the acromioclavicular joint patient tolerated procedure well felt immediate pain relief follow-up prn    Return to clinic No follow-ups on file. Jimmie Rodriguez     Please be aware portions of this note were completed using voice recognition software and unforeseen errors may have occurred    Electronically signed by La Malik DO, FAOASM on 11/16/20 at 10:04 AM EST

## 2020-11-19 ENCOUNTER — TELEPHONE (OUTPATIENT)
Dept: ORTHOPEDIC SURGERY | Age: 65
End: 2020-11-19

## 2020-11-20 ENCOUNTER — TELEPHONE (OUTPATIENT)
Dept: ORTHOPEDIC SURGERY | Age: 65
End: 2020-11-20

## 2020-11-20 NOTE — TELEPHONE ENCOUNTER
I witnessed Dr Tate Mane discussion with patient on 2020 . The patient was advised he received an  medication and was provided an opportunity to ask questions.

## 2020-11-24 RX ORDER — METOPROLOL SUCCINATE 100 MG/1
TABLET, EXTENDED RELEASE ORAL
Qty: 30 TABLET | Refills: 0 | Status: SHIPPED | OUTPATIENT
Start: 2020-11-24 | End: 2021-01-22

## 2020-12-10 RX ORDER — RIVAROXABAN 20 MG/1
TABLET, FILM COATED ORAL
Qty: 30 TABLET | Refills: 0 | Status: SHIPPED | OUTPATIENT
Start: 2020-12-10 | End: 2021-03-15 | Stop reason: SDUPTHER

## 2020-12-10 NOTE — TELEPHONE ENCOUNTER
LOV 10/05/2020    Next appt 4/5/2021    Health Maintenance   Topic Date Due    Hepatitis C screen  1955    HIV screen  10/16/1972    DTaP/Tdap/Td vaccine (1 - Tdap) 10/16/1976    Shingles Vaccine (1 of 2) 10/16/2007    A1C test (Diabetic or Prediabetic)  05/18/2019    Annual Wellness Visit (AWV)  10/06/2021    Lipid screen  10/22/2021    Colon cancer screen colonoscopy  08/01/2024    Flu vaccine  Completed    Hepatitis A vaccine  Aged Out    Hepatitis B vaccine  Aged Out    Hib vaccine  Aged Out    Meningococcal (ACWY) vaccine  Aged Out    Pneumococcal 0-64 years Vaccine  Aged Out             (applicable per patient's age: Cancer Screenings, Depression Screening, Fall Risk Screening, Immunizations)    Hemoglobin A1C (%)   Date Value   05/18/2018 5.8   01/31/2014 5.7     LDL Cholesterol (mg/dL)   Date Value   10/22/2020 81     AST (U/L)   Date Value   10/22/2020 19     ALT (U/L)   Date Value   10/22/2020 16     BUN (mg/dL)   Date Value   10/22/2020 16      (goal A1C is < 7)   (goal LDL is <100) need 30-50% reduction from baseline     BP Readings from Last 3 Encounters:   11/16/20 (!) 144/87   10/05/20 136/84   09/05/19 122/84    (goal /80)      All Future Testing planned in [facility]:  Lab Frequency Next Occurrence   [facility] COLONOSCOPY Once 10/05/2019       Next Visit Date:  Future Appointments   Date Time Provider Queenie Schmitz   4/5/2021  9:20 AM ANCA Hernandez - CNP [facility] [address] [facility]            Patient Active Problem List:     Chronic back pain     Hepatitis B     Degenerative disk disease     Arthritis     Chronic pain     Spinal stenosis     Fractures     Failure of spinal fusion     Irregular heart beat     Hyperlipidemia     Hypertension     Neuropathy     Neurogenic claudication due to lumbar spinal stenosis     Osteoarthritis of right acromioclavicular joint

## 2021-01-22 RX ORDER — METOPROLOL SUCCINATE 100 MG/1
TABLET, EXTENDED RELEASE ORAL
Qty: 30 TABLET | Refills: 0 | Status: SHIPPED | OUTPATIENT
Start: 2021-01-22 | End: 2021-03-25

## 2021-03-19 ENCOUNTER — OFFICE VISIT (OUTPATIENT)
Dept: ORTHOPEDIC SURGERY | Age: 66
End: 2021-03-19
Payer: MEDICARE

## 2021-03-19 VITALS
DIASTOLIC BLOOD PRESSURE: 88 MMHG | SYSTOLIC BLOOD PRESSURE: 135 MMHG | HEART RATE: 68 BPM | BODY MASS INDEX: 36.37 KG/M2 | HEIGHT: 77 IN | WEIGHT: 308 LBS

## 2021-03-19 DIAGNOSIS — M19.011 OSTEOARTHRITIS OF RIGHT ACROMIOCLAVICULAR JOINT: Primary | ICD-10-CM

## 2021-03-19 PROCEDURE — 3017F COLORECTAL CA SCREEN DOC REV: CPT | Performed by: FAMILY MEDICINE

## 2021-03-19 PROCEDURE — 1036F TOBACCO NON-USER: CPT | Performed by: FAMILY MEDICINE

## 2021-03-19 PROCEDURE — G8482 FLU IMMUNIZE ORDER/ADMIN: HCPCS | Performed by: FAMILY MEDICINE

## 2021-03-19 PROCEDURE — G8417 CALC BMI ABV UP PARAM F/U: HCPCS | Performed by: FAMILY MEDICINE

## 2021-03-19 PROCEDURE — 20611 DRAIN/INJ JOINT/BURSA W/US: CPT | Performed by: FAMILY MEDICINE

## 2021-03-19 PROCEDURE — 99213 OFFICE O/P EST LOW 20 MIN: CPT | Performed by: FAMILY MEDICINE

## 2021-03-19 PROCEDURE — G8427 DOCREV CUR MEDS BY ELIG CLIN: HCPCS | Performed by: FAMILY MEDICINE

## 2021-03-19 RX ORDER — TRIAMCINOLONE ACETONIDE 40 MG/ML
40 INJECTION, SUSPENSION INTRA-ARTICULAR; INTRAMUSCULAR ONCE
Status: COMPLETED | OUTPATIENT
Start: 2021-03-19 | End: 2021-03-19

## 2021-03-19 RX ADMIN — TRIAMCINOLONE ACETONIDE 40 MG: 40 INJECTION, SUSPENSION INTRA-ARTICULAR; INTRAMUSCULAR at 11:34

## 2021-03-19 NOTE — PROGRESS NOTES
w/ grafts and hardware.  BACK SURGERY  8/3/09    Anterior approach L4-5 spinal fusion    BACK SURGERY  10/1/08    Hemilaminectomy , decompression, discectomy L4-5, S1    COLONOSCOPY  8/1/2014    resection of polyps    NERVE BLOCK  6-5-13    DURAMORPH EPIDURAL STEROID BLOCK  CELESTONE 6 MORPHINE 1.5 MG    TENDON MANIPULATION Left 1966    Tendon & laceration repair forearm    TONSILLECTOMY  1965       family history includes Cancer in his maternal grandmother, maternal uncle, and mother; Heart Attack in his father; Heart Disease in his paternal aunt; Heart Disease (age of onset: 40) in his father and paternal grandfather. Social History     Socioeconomic History    Marital status: Single     Spouse name: Not on file    Number of children: Not on file    Years of education: Not on file    Highest education level: Not on file   Occupational History    Not on file   Social Needs    Financial resource strain: Not on file    Food insecurity     Worry: Not on file     Inability: Not on file    Transportation needs     Medical: Not on file     Non-medical: Not on file   Tobacco Use    Smoking status: Never Smoker    Smokeless tobacco: Never Used   Substance and Sexual Activity    Alcohol use:  Yes     Alcohol/week: 6.0 standard drinks     Types: 6 Cans of beer per week     Comment: usually weekends    Drug use: No    Sexual activity: Not on file   Lifestyle    Physical activity     Days per week: Not on file     Minutes per session: Not on file    Stress: Not on file   Relationships    Social connections     Talks on phone: Not on file     Gets together: Not on file     Attends Muslim service: Not on file     Active member of club or organization: Not on file     Attends meetings of clubs or organizations: Not on file     Relationship status: Not on file    Intimate partner violence     Fear of current or ex partner: Not on file     Emotionally abused: Not on file     Physically abused: Not on file     Forced sexual activity: Not on file   Other Topics Concern    Not on file   Social History Narrative    Not on file       Current Outpatient Medications   Medication Sig Dispense Refill    rivaroxaban (XARELTO) 20 MG TABS tablet TAKE 1 TABLET BY MOUTH DAILY WITH SUPPER 30 tablet 0    metoprolol succinate (TOPROL XL) 100 MG extended release tablet take 1 tablet by mouth once daily 30 tablet 0    atorvastatin (LIPITOR) 80 MG tablet TAKE 1 TABLET BY MOUTH DAILY 90 tablet 3    omeprazole (PRILOSEC) 40 MG delayed release capsule TAKE 1 CAPSULE BY MOUTH EVERY MORNING BEFORE BREAKFAST (Patient taking differently: PRN) 90 capsule 3    clopidogrel (PLAVIX) 75 MG tablet Take 1 tablet by mouth daily 90 tablet 3    aspirin 81 MG chewable tablet Take 1 tablet by mouth daily 90 tablet 3    Multiple Vitamins-Minerals (MULTIVITAMIN PO) Take 1 tablet by mouth daily. No current facility-administered medications for this visit. Allergies:  hehas No Known Allergies. ROS:  CV:  Denies chest pain; palpitations; shortness of breath; swelling of feet, ankles; and loss of consciousness. CON: Denies fever and dizziness. ENT:  Denies hearing loss / ringing, ear infections hoarseness, and swallowing problems. RESP:  Denies chronic cough, spitting up blood, and asthma/wheezing. GI: Denies abdominal pain, change in bowel habits, nausea or vomiting, and blood in stools. :  Denies frequent urination, burning or painful urination, blood in the urine, and bladder incontinence. NEURO:  Denies headache, memory loss, sleep disturbance, and tremor or movement disorder. PHYSICAL EXAM:   /88   Pulse 68   Ht 6' 5\" (1.956 m)   Wt (!) 308 lb (139.7 kg)   BMI 36.52 kg/m²   GENERAL: Dayo Stroud is a 63 y.o. male who is alert and oriented and sitting comfortably in our office. SKIN:  Intact without rashes, lesions or ulcerations. No obvious deformity or swelling.   NEURO: Musculoskeletal and axillary nerves intact to sensory and motor testing. EYES:  Extraocular muscles intact. MOUTH: Oral mucosa moist.  No perioral lesions. PULM:  Respirations unlabored and regular. VASC:  Capillary refill less than 3 seconds. Cervical spine ROM WNL  Spurlings: negative,     MSK:  Forward elevation 160 degrees, external rotation in neutral 80 degrees, abduction 160 degrees, internal rotation to L3. Supraspinatus 5/5   External rotators 5/5  Internal 5/5  Full Can negative   Empty Can negative   Neer's test negative   Jang-Percy test. negative. Pain with cross body adduction positive. Anterior Labral Stress test negative. Speed's test negative   Kill Buck's test negative. Pain over anterolateral acromion negative. Pain over AC joint positive. Pain over traps/rhomboids negative. PSYCH:  Patient has good fund of knowledge and displays understanding of exam.    RADIOLOGY: No results found. IMPRESSION:     1. Osteoarthritis of right acromioclavicular joint        PLAN:   We discussed some of the etiologies and natural histories of     ICD-10-CM    1. Osteoarthritis of right acromioclavicular joint  M19.011      We discussed the various treatment alternatives including anti-inflammatory medications, physical therapy, injections, further imaging studies and as a last resort surgery.   Point I do think aspiration of this ganglion cyst is appropriate after the risk benefits alternatives of procedure itself discussed verbal consent was obtained patient's right shoulder is prepped in a sterile manner with Betadine skin was cooled with cold spray recleaned with alcohol prep under ultrasound guidance we introduced a 18-gauge needle and aspirated the thick contents of that ganglion cyst as it did not connect to the acromioclavicular joint we injected 1 mL 40 mg Kenalog into the cyst not the joint itself and put a Band-Aid on top patient tolerated procedure well felt immediate pain relief and called Dr. Vargas Ervin his surgeon and discussed possibility of repeat surgical consultation for which he agreed may be appropriate with recurrence of this issue he will see Dr. Petar Santos in the near future and follow-up with me as needed    Return to clinic No follow-ups on file. Srikanth Olivas     Please be aware portions of this note were completed using voice recognition software and unforeseen errors may have occurred    Electronically signed by Cassius Craig DO, FAOASM on 3/19/21 at 10:29 AM EDT

## 2021-03-23 ENCOUNTER — TELEPHONE (OUTPATIENT)
Dept: ORTHOPEDIC SURGERY | Age: 66
End: 2021-03-23

## 2021-03-23 NOTE — TELEPHONE ENCOUNTER
Patient called office back but phone disconnected before I could speak with him. I called patient back and spoke with him. Told him that Dr. Adina Rebolledo would like the patient to call us approx 2 months following the inject by Dr. Clemencia Azul on 3/19/21. If he is painful then it is likely that Dr. Adina Rebolledo will schedule the patient for sx. However the patient will have to wait a minimum of 3 months for sx following the injection.

## 2021-03-23 NOTE — TELEPHONE ENCOUNTER
WESTM with patient requesting that he call the office back when he has time. Dr. Ev Coronel contacted Dr. James Cespedes and informed him that he performed another inject on the patients [facility] jt. Dr. Ev Coronel believes surgical intervention may be necessary. However, sx cannot be performed for 3 months following the inject. Patient will need to contact the office approx 2 months post injection to update us on his status. Treatment going forward at that point will depend on how his [facility] jt is feeling.

## 2021-03-25 RX ORDER — METOPROLOL SUCCINATE 100 MG/1
TABLET, EXTENDED RELEASE ORAL
Qty: 30 TABLET | Refills: 0 | Status: SHIPPED | OUTPATIENT
Start: 2021-03-25 | End: 2021-04-05 | Stop reason: SDUPTHER

## 2021-04-05 ENCOUNTER — OFFICE VISIT (OUTPATIENT)
Dept: PRIMARY CARE CLINIC | Age: 66
End: 2021-04-05
Payer: MEDICARE

## 2021-04-05 ENCOUNTER — IMMUNIZATION (OUTPATIENT)
Dept: PRIMARY CARE CLINIC | Age: 66
End: 2021-04-05
Payer: MEDICARE

## 2021-04-05 VITALS
HEIGHT: 77 IN | WEIGHT: 300.2 LBS | SYSTOLIC BLOOD PRESSURE: 138 MMHG | BODY MASS INDEX: 35.45 KG/M2 | DIASTOLIC BLOOD PRESSURE: 84 MMHG | OXYGEN SATURATION: 95 % | RESPIRATION RATE: 15 BRPM | HEART RATE: 66 BPM

## 2021-04-05 DIAGNOSIS — I10 ESSENTIAL HYPERTENSION: Primary | ICD-10-CM

## 2021-04-05 DIAGNOSIS — R10.13 EPIGASTRIC PAIN: ICD-10-CM

## 2021-04-05 DIAGNOSIS — I48.91 ATRIAL FIBRILLATION, UNSPECIFIED TYPE (HCC): ICD-10-CM

## 2021-04-05 DIAGNOSIS — E66.01 SEVERE OBESITY (BMI 35.0-39.9) WITH COMORBIDITY (HCC): ICD-10-CM

## 2021-04-05 PROCEDURE — G8417 CALC BMI ABV UP PARAM F/U: HCPCS | Performed by: NURSE PRACTITIONER

## 2021-04-05 PROCEDURE — 1036F TOBACCO NON-USER: CPT | Performed by: NURSE PRACTITIONER

## 2021-04-05 PROCEDURE — 0001A COVID-19, PFIZER VACCINE 30MCG/0.3ML DOSE: CPT | Performed by: INTERNAL MEDICINE

## 2021-04-05 PROCEDURE — 91300 COVID-19, PFIZER VACCINE 30MCG/0.3ML DOSE: CPT | Performed by: INTERNAL MEDICINE

## 2021-04-05 PROCEDURE — 3017F COLORECTAL CA SCREEN DOC REV: CPT | Performed by: NURSE PRACTITIONER

## 2021-04-05 PROCEDURE — 99214 OFFICE O/P EST MOD 30 MIN: CPT | Performed by: NURSE PRACTITIONER

## 2021-04-05 PROCEDURE — G8427 DOCREV CUR MEDS BY ELIG CLIN: HCPCS | Performed by: NURSE PRACTITIONER

## 2021-04-05 RX ORDER — METOPROLOL SUCCINATE 100 MG/1
TABLET, EXTENDED RELEASE ORAL
Qty: 90 TABLET | Refills: 1 | Status: SHIPPED | OUTPATIENT
Start: 2021-04-05 | End: 2021-06-01

## 2021-04-05 ASSESSMENT — ENCOUNTER SYMPTOMS
SHORTNESS OF BREATH: 0
ABDOMINAL PAIN: 0
COUGH: 0
BACK PAIN: 0

## 2021-04-05 ASSESSMENT — PATIENT HEALTH QUESTIONNAIRE - PHQ9
SUM OF ALL RESPONSES TO PHQ QUESTIONS 1-9: 0
SUM OF ALL RESPONSES TO PHQ QUESTIONS 1-9: 0
1. LITTLE INTEREST OR PLEASURE IN DOING THINGS: 0

## 2021-04-05 NOTE — PROGRESS NOTES
[facility] PRIMARY CARE  [address]   [address]  [address] [address]  Dept: [phone]  Dept Fax: [phone]    Merlinda Hals is a 63 y.o. male who presentstoday for his medical conditions/complaints as noted below.   Merlinda Hals is c/o of  Chief Complaint   Patient presents with    6 Month Follow-Up    Chronic Pain    Shoulder Pain     right shoulder            HPI:     Presents for 6 month recheck on chronic conditions  BP well controlled  Has lost 8lb since last visit- walking regularly  Has a gym membership but has not been using    C/o episodes daily to every other day of feeling off, sweating profusely SOB  Resolves on its own  Follows with Dr. Tamiko Dinh for cardiology  Reviewed echo/stress in July 2020, WNL  Advised him to update their office of these symptoms    Continued abdominal pain since October  Was not able to afford nexium  Xray WNL  Labs WNL  Has not tried PPI BID x 2 weeks, will try and if no improvement notify office    Continued right shoulder pain  Feels this is worse prior to injections  Will continue to monitor- does not want surgery  Ganglion cyst does not improve with aspiration  Continues to drain today here in the office    Denies any other problems/concerns  Covid vaccine will be updated today        Hemoglobin A1C (%)   Date Value   05/18/2018 5.8   01/31/2014 5.7             ( goal A1C is < 7)   No results found for: LABMICR  LDL Cholesterol (mg/dL)   Date Value   10/22/2020 81   01/31/2014 114 (H)       (goal LDL is <100)   AST (U/L)   Date Value   10/22/2020 19     ALT (U/L)   Date Value   10/22/2020 16     BUN (mg/dL)   Date Value   10/22/2020 16     BP Readings from Last 3 Encounters:   04/05/21 138/84   03/19/21 135/88   11/16/20 (!) 144/87          ([id number])    Past Medical History:   Diagnosis Date    Arthritis     Chronic back pain     Chronic pain     Colon polyps     Degenerative disk disease     Failure of spinal fusion  Fractures     Hx multiple fractures:  bilat ankles, Ribs x3, Nose, Collar bone, fingers, toes thumb    Hepatitis B     Hyperlipidemia     ON RX    Hypertension 2001    ON RX    Irregular heart beat 2012    Neuropathy     BILAT LEGS    Spinal stenosis       Past Surgical History:   Procedure Laterality Date    BACK SURGERY  7/2012    Decompressive laminectomy/ medial w/ medial fecetectomies    BACK SURGERY  1/7/11    Decompressive laminectomy/ lumbar fusion w/ grafts and hardware.  BACK SURGERY  8/3/09    Anterior approach L4-5 spinal fusion    BACK SURGERY  10/1/08    Hemilaminectomy , decompression, discectomy L4-5, S1    COLONOSCOPY  8/1/2014    resection of polyps    NERVE BLOCK  6-5-13    DURAMORPH EPIDURAL STEROID BLOCK  CELESTONE 6 MORPHINE 1.5 MG    TENDON MANIPULATION Left 1966    Tendon & laceration repair forearm    TONSILLECTOMY  1965       Family History   Problem Relation Age of Onset    Heart Attack Father         passed away of massive MI @ 40    Heart Disease Father 40        MI'S AND OPEN HEART    Cancer Mother         age 70, lung CA    Cancer Maternal Uncle         PROSTATE    Heart Disease Paternal Aunt         OPEN HEART    Cancer Maternal Grandmother     Heart Disease Paternal Grandfather 40        MI          Social History     Tobacco Use    Smoking status: Never Smoker    Smokeless tobacco: Never Used   Substance Use Topics    Alcohol use:  Yes     Alcohol/week: 6.0 standard drinks     Types: 6 Cans of beer per week     Comment: usually weekends      Current Outpatient Medications   Medication Sig Dispense Refill    metoprolol succinate (TOPROL XL) 100 MG extended release tablet take 1 tablet by mouth once daily 90 tablet 1    rivaroxaban (XARELTO) 20 MG TABS tablet TAKE 1 TABLET BY MOUTH DAILY WITH SUPPER 30 tablet 3    atorvastatin (LIPITOR) 80 MG tablet TAKE 1 TABLET BY MOUTH DAILY 90 tablet 3    omeprazole (PRILOSEC) 40 MG delayed release capsule TAKE 1 Musculoskeletal: Normal range of motion. Cardiovascular:      Rate and Rhythm: Normal rate and regular rhythm. Heart sounds: Normal heart sounds. Pulmonary:      Effort: Pulmonary effort is normal.      Breath sounds: Normal breath sounds. Abdominal:      General: Bowel sounds are normal.      Palpations: Abdomen is soft. Tenderness: There is no abdominal tenderness. Musculoskeletal: Normal range of motion. Skin:     General: Skin is warm and dry. Neurological:      Mental Status: He is alert and oriented to person, place, and time. Psychiatric:         Behavior: Behavior normal.         Thought Content: Thought content normal.         Judgment: Judgment normal.       /84   Pulse 66   Resp 15   Ht 6' 5\" (1.956 m)   Wt (!) 300 lb 3.2 oz (136.2 kg)   SpO2 95%   BMI 35.60 kg/m²     Assessment:       Diagnosis Orders   1. Essential hypertension     2. Atrial fibrillation, unspecified type ([address]     3. Severe obesity (BMI 35.0-39. 9) with comorbidity ([address]     4. Epigastric pain               Plan:      Return in about 6 months (around 10/5/2021) for AWV.    1. HTN/afib- Stable. Update cardiology of symptoms he is having as discussed above. Continue current meds. Follow up in six months for recheck/earlier if needed. 2. Obesity- Stable. Encouraged diet/exercise. Follow up in six months for recheck. 3. Epigastric pain- Will try omeprazole BID x 2 weeks. If no improvement in pain notify office and will refer to GI for further eval. Follow up in six months/earlier if needed. Orders Placed This Encounter   Medications    metoprolol succinate (TOPROL XL) 100 MG extended release tablet     Sig: take 1 tablet by mouth once daily     Dispense:  90 tablet     Refill:  1    rivaroxaban (XARELTO) 20 MG TABS tablet     Sig: TAKE 1 TABLET BY MOUTH DAILY WITH SUPPER     Dispense:  30 tablet     Refill:  3       Patient given educational materials - see patient instructions. Discussed use, benefit, and side effects of prescribed medications. All patientquestions answered. Pt voiced understanding. Reviewed health maintenance. Instructedto continue current medications, diet and exercise. Patient agreed with treatmentplan. Follow up as directed.      Electronicallysigned by ANCA Watt CNP on 4/5/2021 at 9:42 AM

## 2021-04-12 ENCOUNTER — TELEPHONE (OUTPATIENT)
Dept: ORTHOPEDIC SURGERY | Age: 66
End: 2021-04-12

## 2021-04-26 ENCOUNTER — IMMUNIZATION (OUTPATIENT)
Dept: PRIMARY CARE CLINIC | Age: 66
End: 2021-04-26
Payer: MEDICARE

## 2021-04-26 PROCEDURE — 91300 COVID-19, PFIZER VACCINE 30MCG/0.3ML DOSE: CPT | Performed by: INTERNAL MEDICINE

## 2021-04-26 PROCEDURE — 0002A COVID-19, PFIZER VACCINE 30MCG/0.3ML DOSE: CPT | Performed by: INTERNAL MEDICINE

## 2021-06-01 ENCOUNTER — TELEPHONE (OUTPATIENT)
Dept: PRIMARY CARE CLINIC | Age: 66
End: 2021-06-01

## 2021-06-01 RX ORDER — METOPROLOL SUCCINATE 100 MG/1
TABLET, EXTENDED RELEASE ORAL
Qty: 30 TABLET | Refills: 3 | Status: SHIPPED | OUTPATIENT
Start: 2021-06-01 | End: 2021-10-07 | Stop reason: SDUPTHER

## 2021-06-01 RX ORDER — AMOXICILLIN AND CLAVULANATE POTASSIUM 875; 125 MG/1; MG/1
1 TABLET, FILM COATED ORAL 2 TIMES DAILY
Qty: 20 TABLET | Refills: 0 | Status: SHIPPED | OUTPATIENT
Start: 2021-06-01 | End: 2021-06-11

## 2021-10-07 ENCOUNTER — OFFICE VISIT (OUTPATIENT)
Dept: PRIMARY CARE CLINIC | Age: 66
End: 2021-10-07
Payer: MEDICARE

## 2021-10-07 ENCOUNTER — HOSPITAL ENCOUNTER (OUTPATIENT)
Age: 66
Setting detail: SPECIMEN
Discharge: HOME | End: 2021-10-07
Payer: MEDICARE

## 2021-10-07 ENCOUNTER — TELEPHONE (OUTPATIENT)
Dept: PRIMARY CARE CLINIC | Age: 66
End: 2021-10-07

## 2021-10-07 VITALS
WEIGHT: 311.6 LBS | OXYGEN SATURATION: 96 % | HEIGHT: 77 IN | BODY MASS INDEX: 36.79 KG/M2 | HEART RATE: 54 BPM | SYSTOLIC BLOOD PRESSURE: 136 MMHG | RESPIRATION RATE: 14 BRPM | DIASTOLIC BLOOD PRESSURE: 84 MMHG

## 2021-10-07 DIAGNOSIS — R73.09 ELEVATED GLUCOSE: ICD-10-CM

## 2021-10-07 DIAGNOSIS — M54.41 CHRONIC MIDLINE LOW BACK PAIN WITH BILATERAL SCIATICA: ICD-10-CM

## 2021-10-07 DIAGNOSIS — I49.9 IRREGULAR HEART BEAT: ICD-10-CM

## 2021-10-07 DIAGNOSIS — Z79.899 CURRENT USE OF PROTON PUMP INHIBITOR: ICD-10-CM

## 2021-10-07 DIAGNOSIS — Z00.00 ENCOUNTER FOR GENERAL ADULT MEDICAL EXAMINATION W/O ABNORMAL FINDINGS: Primary | ICD-10-CM

## 2021-10-07 DIAGNOSIS — Z13.220 SCREENING FOR LIPID DISORDERS: ICD-10-CM

## 2021-10-07 DIAGNOSIS — Z13.29 SCREENING FOR THYROID DISORDER: ICD-10-CM

## 2021-10-07 DIAGNOSIS — R10.13 EPIGASTRIC PAIN: ICD-10-CM

## 2021-10-07 DIAGNOSIS — Z13.0 SCREENING FOR DEFICIENCY ANEMIA: ICD-10-CM

## 2021-10-07 DIAGNOSIS — Z13.1 SCREENING FOR DIABETES MELLITUS: ICD-10-CM

## 2021-10-07 DIAGNOSIS — G89.29 CHRONIC MIDLINE LOW BACK PAIN WITH BILATERAL SCIATICA: ICD-10-CM

## 2021-10-07 DIAGNOSIS — Z12.5 SCREENING FOR PROSTATE CANCER: ICD-10-CM

## 2021-10-07 DIAGNOSIS — M54.42 CHRONIC MIDLINE LOW BACK PAIN WITH BILATERAL SCIATICA: ICD-10-CM

## 2021-10-07 LAB
ALBUMIN SERPL-MCNC: 4.7 G/DL (ref 3.5–5.2)
ALBUMIN/GLOBULIN RATIO: 1.9 (ref 1–2.5)
ALP BLD-CCNC: 104 U/L (ref 40–129)
ALT SERPL-CCNC: 18 U/L (ref 5–41)
ANION GAP SERPL CALCULATED.3IONS-SCNC: 17 MMOL/L (ref 9–17)
AST SERPL-CCNC: 18 U/L
BILIRUB SERPL-MCNC: 0.34 MG/DL (ref 0.3–1.2)
BUN BLDV-MCNC: 16 MG/DL (ref 8–23)
BUN/CREAT BLD: ABNORMAL (ref 9–20)
CALCIUM SERPL-MCNC: 9.3 MG/DL (ref 8.6–10.4)
CHLORIDE BLD-SCNC: 99 MMOL/L (ref 98–107)
CHOLESTEROL/HDL RATIO: 4.8
CHOLESTEROL: 213 MG/DL
CO2: 20 MMOL/L (ref 20–31)
CREAT SERPL-MCNC: 1.05 MG/DL (ref 0.7–1.2)
ESTIMATED AVERAGE GLUCOSE: 126 MG/DL
GFR AFRICAN AMERICAN: >60 ML/MIN
GFR NON-AFRICAN AMERICAN: >60 ML/MIN
GFR SERPL CREATININE-BSD FRML MDRD: ABNORMAL ML/MIN/{1.73_M2}
GFR SERPL CREATININE-BSD FRML MDRD: ABNORMAL ML/MIN/{1.73_M2}
GLUCOSE BLD-MCNC: 108 MG/DL (ref 70–99)
HBA1C MFR BLD: 6 % (ref 4–6)
HCT VFR BLD CALC: 44.2 % (ref 40.7–50.3)
HDLC SERPL-MCNC: 44 MG/DL
HEMOGLOBIN: 14.2 G/DL (ref 13–17)
LDL CHOLESTEROL DIRECT: 113 MG/DL
LDL CHOLESTEROL: ABNORMAL MG/DL (ref 0–130)
MCH RBC QN AUTO: 29.8 PG (ref 25.2–33.5)
MCHC RBC AUTO-ENTMCNC: 32.1 G/DL (ref 28.4–34.8)
MCV RBC AUTO: 92.7 FL (ref 82.6–102.9)
NRBC AUTOMATED: 0 PER 100 WBC
PDW BLD-RTO: 12.9 % (ref 11.8–14.4)
PLATELET # BLD: 313 K/UL (ref 138–453)
PMV BLD AUTO: 10.2 FL (ref 8.1–13.5)
POTASSIUM SERPL-SCNC: 4.9 MMOL/L (ref 3.7–5.3)
PROSTATE SPECIFIC ANTIGEN: 1.12 UG/L
RBC # BLD: 4.77 M/UL (ref 4.21–5.77)
SODIUM BLD-SCNC: 136 MMOL/L (ref 135–144)
TOTAL PROTEIN: 7.2 G/DL (ref 6.4–8.3)
TRIGL SERPL-MCNC: 498 MG/DL
TSH SERPL DL<=0.05 MIU/L-ACNC: 0.76 MIU/L (ref 0.3–5)
VLDLC SERPL CALC-MCNC: ABNORMAL MG/DL (ref 1–30)
WBC # BLD: 18.8 K/UL (ref 3.5–11.3)

## 2021-10-07 PROCEDURE — G0439 PPPS, SUBSEQ VISIT: HCPCS | Performed by: NURSE PRACTITIONER

## 2021-10-07 PROCEDURE — 3017F COLORECTAL CA SCREEN DOC REV: CPT | Performed by: NURSE PRACTITIONER

## 2021-10-07 PROCEDURE — G8484 FLU IMMUNIZE NO ADMIN: HCPCS | Performed by: NURSE PRACTITIONER

## 2021-10-07 RX ORDER — METOPROLOL SUCCINATE 100 MG/1
100 TABLET, EXTENDED RELEASE ORAL DAILY
Qty: 90 TABLET | Refills: 3 | Status: SHIPPED | OUTPATIENT
Start: 2021-10-07 | End: 2022-01-26 | Stop reason: SDUPTHER

## 2021-10-07 RX ORDER — ATORVASTATIN CALCIUM 80 MG/1
80 TABLET, FILM COATED ORAL DAILY
Qty: 90 TABLET | Refills: 3 | Status: SHIPPED | OUTPATIENT
Start: 2021-10-07 | End: 2021-11-01

## 2021-10-07 RX ORDER — OMEPRAZOLE 40 MG/1
CAPSULE, DELAYED RELEASE ORAL
Qty: 90 CAPSULE | Refills: 3 | Status: SHIPPED | OUTPATIENT
Start: 2021-10-07 | End: 2021-11-01

## 2021-10-07 RX ORDER — CLOPIDOGREL BISULFATE 75 MG/1
75 TABLET ORAL DAILY
Qty: 90 TABLET | Refills: 3 | Status: SHIPPED | OUTPATIENT
Start: 2021-10-07 | End: 2021-10-07

## 2021-10-07 SDOH — ECONOMIC STABILITY: FOOD INSECURITY: WITHIN THE PAST 12 MONTHS, THE FOOD YOU BOUGHT JUST DIDN'T LAST AND YOU DIDN'T HAVE MONEY TO GET MORE.: NEVER TRUE

## 2021-10-07 SDOH — ECONOMIC STABILITY: FOOD INSECURITY: WITHIN THE PAST 12 MONTHS, YOU WORRIED THAT YOUR FOOD WOULD RUN OUT BEFORE YOU GOT MONEY TO BUY MORE.: NEVER TRUE

## 2021-10-07 ASSESSMENT — PATIENT HEALTH QUESTIONNAIRE - PHQ9
SUM OF ALL RESPONSES TO PHQ9 QUESTIONS 1 & 2: 0
SUM OF ALL RESPONSES TO PHQ QUESTIONS 1-9: 0
1. LITTLE INTEREST OR PLEASURE IN DOING THINGS: 0
SUM OF ALL RESPONSES TO PHQ QUESTIONS 1-9: 0
SUM OF ALL RESPONSES TO PHQ QUESTIONS 1-9: 0
2. FEELING DOWN, DEPRESSED OR HOPELESS: 0

## 2021-10-07 ASSESSMENT — SOCIAL DETERMINANTS OF HEALTH (SDOH): HOW HARD IS IT FOR YOU TO PAY FOR THE VERY BASICS LIKE FOOD, HOUSING, MEDICAL CARE, AND HEATING?: NOT HARD AT ALL

## 2021-10-07 ASSESSMENT — LIFESTYLE VARIABLES: HOW OFTEN DO YOU HAVE A DRINK CONTAINING ALCOHOL: 0

## 2021-10-07 NOTE — TELEPHONE ENCOUNTER
Kyle Garza from Dr. wHang Early office called asking if pt is being referred for a colonoscopy or an office visit first. Referral was worded unusually, so office would like to verify. CB# [id number].

## 2021-10-07 NOTE — PROGRESS NOTES
Medicare Annual Wellness Visit  Name: Cayla Anguiano Date: 10/7/2021   MRN: [id number] Sex: Male   Age: 63 y.o. Ethnicity: Non- / Non    : 1955 Race: White (non-)      Marty Prince is here for Medicare AWV    Screenings for behavioral, psychosocial and functional/safety risks, and cognitive dysfunction are all negative except as indicated below. These results, as well as other patient data from the [address] form, are documented in Flowsheets linked to this Encounter. No Known Allergies    Prior to Visit Medications    Medication Sig Taking? Authorizing Provider   metoprolol succinate (TOPROL XL) 100 MG extended release tablet Take 1 tablet by mouth daily Yes ANCA Cr CNP   rivaroxaban (XARELTO) 20 MG TABS tablet TAKE 1 TABLET BY MOUTH DAILY WITH SUPPER Yes ANCA Cr CNP   atorvastatin (LIPITOR) 80 MG tablet Take 1 tablet by mouth daily Yes ANCA Cr CNP   omeprazole (PRILOSEC) 40 MG delayed release capsule TAKE 1 CAPSULE BY MOUTH EVERY MORNING BEFORE BREAKFAST Yes ANCA Cr CNP   clopidogrel (PLAVIX) 75 MG tablet Take 1 tablet by mouth daily Yes ANCA Cr CNP   aspirin 81 MG chewable tablet Take 1 tablet by mouth daily Yes ANCA Cr CNP   Multiple Vitamins-Minerals (MULTIVITAMIN PO) Take 1 tablet by mouth daily.  Yes Historical Provider, MD       Past Medical History:   Diagnosis Date    Arthritis     Chronic back pain     Chronic pain     Colon polyps     Degenerative disk disease     Failure of spinal fusion     Fractures     Hx multiple fractures:  bilat ankles, Ribs x3, Nose, Collar bone, fingers, toes thumb    Hepatitis B     Hyperlipidemia     ON RX    Hypertension     ON RX    Irregular heart beat 2012    Neuropathy     BILAT LEGS    Spinal stenosis        Past Surgical History:   Procedure Laterality Date    BACK SURGERY  2012 Decompressive laminectomy/ medial w/ medial fecetectomies    BACK SURGERY  1/7/11    Decompressive laminectomy/ lumbar fusion w/ grafts and hardware.  BACK SURGERY  8/3/09    Anterior approach L4-5 spinal fusion    BACK SURGERY  10/1/08    Hemilaminectomy , decompression, discectomy L4-5, S1    COLONOSCOPY  8/1/2014    resection of polyps    NERVE BLOCK  6-5-13    DURAMORPH EPIDURAL STEROID BLOCK  CELESTONE 6 MORPHINE 1.5 MG    TENDON MANIPULATION Left 1966    Tendon & laceration repair forearm    TONSILLECTOMY  1965       Family History   Problem Relation Age of Onset    Heart Attack Father         passed away of massive MI @ 40    Heart Disease Father 40        MI'S AND OPEN HEART    Cancer Mother         age 70, lung CA    Cancer Maternal Uncle         PROSTATE    Heart Disease Paternal Aunt         OPEN HEART    Cancer Maternal Grandmother     Heart Disease Paternal Grandfather 40        MI       CareTeam (Including outside providers/suppliers regularly involved in providing care):   Patient Care Team:  ANCA Archuleta CNP as PCP - General (Nurse Practitioner)  ANCA Archuleta CNP as PCP - [facility] Empaneled Provider  Blair Castano RN as Registered Nurse    Wt Readings from Last 3 Encounters:   10/07/21 (!) 311 lb 9.6 oz (141.3 kg)   04/05/21 (!) 300 lb 3.2 oz (136.2 kg)   03/19/21 (!) 308 lb (139.7 kg)     Vitals:    10/07/21 0907 10/07/21 0914   BP: (!) 154/96 136/84   Pulse: 54    Resp: 14    SpO2: 96%    Weight: (!) 311 lb 9.6 oz (141.3 kg)    Height: 6' 5\" (1.956 m)      Body mass index is 36.95 kg/m². Based upon direct observation of the patient, evaluation of cognition reveals recent and remote memory intact.     General Appearance: alert and oriented to person, place and time, well developed and well- nourished, in no acute distress  Skin: warm and dry, no rash or erythema  Head: normocephalic and atraumatic  Eyes: pupils equal, round, and reactive to light, extraocular eye movements intact, conjunctivae normal  ENT: tympanic membrane, external ear and ear canal normal bilaterally, nose without deformity, nasal mucosa and turbinates normal without polyps  Neck: supple and non-tender without mass, no thyromegaly or thyroid nodules, no cervical lymphadenopathy  Pulmonary/Chest: clear to auscultation bilaterally- no wheezes, rales or rhonchi, normal air movement, no respiratory distress  Cardiovascular: normal rate, regular rhythm, normal S1 and S2, no murmurs, rubs, clicks, or gallops, distal pulses intact, no carotid bruits  Abdomen: soft, non-tender, non-distended, normal bowel sounds, no masses or organomegaly  Extremities: no cyanosis, clubbing or edema  Musculoskeletal: normal range of motion, no joint swelling, deformity or tenderness  Neurologic: reflexes normal and symmetric, no cranial nerve deficit, gait, coordination and speech normal    Patient's complete Health Risk Assessment and screening values have been reviewed and are found in Flowsheets. The following problems were reviewed today and where indicated follow up appointments were made and/or referrals ordered. Positive Risk Factor Screenings with Interventions:          General Health and ACP:  General  In general, how would you say your health is?: Fair  In the past 7 days, have you experienced any of the following?  New or Increased Pain, New or Increased Fatigue, Loneliness, Social Isolation, Stress or Anger?: None of These  Do you get the social and emotional support that you need?: Yes  Do you have a Living Will?: (!) No  Advance Directives     Power of [address] Will ACP-Advance Directive ACP-Power of     Not on File Not on File Not on File Not on File      General Health Risk Interventions:  · No Living Will: Patient declines ACP discussion/assistance    Health Habits/Nutrition:  Health Habits/Nutrition  Do you exercise for at least 20 minutes 2-3 times per week?: (!) No  Have you lost any weight without trying in the past 3 months?: No  Do you eat only one meal per day?: (!) Yes  Have you seen the dentist within the past year?: Yes  Body mass index: (!) 36.95  Health Habits/Nutrition Interventions:  · Inadequate physical activity:  patient agrees to exercise for at least 150 minutes/week   · Eating one meal daily is his normal routine    Hearing/Vision:  No exam data present  Hearing/Vision  Do you or your family notice any trouble with your hearing that hasn't been managed with hearing aids?: No  Do you have difficulty driving, watching TV, or doing any of your daily activities because of your eyesight?: No  Have you had an eye exam within the past year?: (!) No  Hearing/Vision Interventions:  · Vision concerns:  patient encouraged to make appointment with his/her eye specialist      Personalized Preventive Plan   Current Health Maintenance Status  Immunization History   Administered Date(s) Administered    COVID-19, Nicholas Peter, PF, 30mcg/0.3mL 04/05/2021, 04/26/2021    Influenza, Quadv, IM, PF (6 mo and older Fluzone, Flulaval, Fluarix, and 3 yrs and older Afluria) 10/05/2020        Health Maintenance   Topic Date Due    A1C test (Diabetic or Prediabetic)  05/18/2019    Flu vaccine (1) 09/01/2021    Annual Wellness Visit (AWV)  10/06/2021    DTaP/Tdap/Td vaccine (1 - Tdap) 10/28/2021 (Originally 10/16/1976)    Shingles Vaccine (1 of 2) 10/07/2022 (Originally 10/16/2007)    Lipid screen  10/22/2021    Colon cancer screen colonoscopy  08/01/2024    COVID-19 Vaccine  Completed    Hepatitis A vaccine  Aged Out    Hepatitis B vaccine  Aged Out    Hib vaccine  Aged Out    Meningococcal (ACWY) vaccine  Aged Out    Pneumococcal 0-64 years Vaccine  Aged Out    Hepatitis C screen  Discontinued    HIV screen  Discontinued     Recommendations for Preventive Services Due: see orders and patient instructions/AVS.  .   Recommended screening schedule for the next 5-10 years is provided to the patient in written form: see Patient Concepcion Beebe was seen today for medicare awv. Diagnoses and all orders for this visit:    Screening for diabetes mellitus  -     Comprehensive Metabolic Panel; Future    Screening for prostate cancer  -     PSA screening; Future    Screening for thyroid disorder  -     TSH with Reflex; Future    Screening for lipid disorders  -     Lipid Panel; Future    Screening for deficiency anemia  -     CBC; Future    Epigastric pain  -     AFL - Janki Rossi DO, Gastroenterology, [place]    Current use of proton pump inhibitor  -     AFL - Janki Rossi DO, Gastroenterology, [place]    Elevated glucose  -     Hemoglobin A1C; Future    Other orders  -     metoprolol succinate (TOPROL XL) 100 MG extended release tablet; Take 1 tablet by mouth daily  -     rivaroxaban (XARELTO) 20 MG TABS tablet; TAKE 1 TABLET BY MOUTH DAILY WITH SUPPER  -     atorvastatin (LIPITOR) 80 MG tablet; Take 1 tablet by mouth daily  -     omeprazole (PRILOSEC) 40 MG delayed release capsule; TAKE 1 CAPSULE BY MOUTH EVERY MORNING BEFORE BREAKFAST  -     clopidogrel (PLAVIX) 75 MG tablet; Take 1 tablet by mouth daily           Presents for AWV  BP well controlled  Has gained 11lb since last visit- unable to exercise d/t chronic lower back and leg pain  Hx of surgery with Dr. Trip Sarabia, who retired. Declines new referral to neurosurg- will not pursue any surgical intervention  Declines referral to pain management  Willing to consider PT, depending on insurance coverage.  Referral given  Unable to get comfortable in exam room today, frequent position changes from sitting/standing and shaking out his legs    C/o epigastric pain, not relieved with PPI  Would like referral to GI  Denies any chest pain  Increase in SOB but relates this to weight gain    Hx of afib, in irregular rhythm today  Follows with cardiology biannually  Compliant with beta blocker/xarelto/ASA  Next appt in February with cardiology    Willing to update annual labs    Denies any other problems/concerns  Follow up in six months for recheck

## 2021-10-17 DIAGNOSIS — D72.829 LEUKOCYTOSIS, UNSPECIFIED TYPE: Primary | ICD-10-CM

## 2021-11-01 RX ORDER — OMEPRAZOLE 40 MG/1
CAPSULE, DELAYED RELEASE ORAL
Qty: 90 CAPSULE | Refills: 3 | Status: SHIPPED | OUTPATIENT
Start: 2021-11-01 | End: 2022-04-07

## 2021-11-01 RX ORDER — ATORVASTATIN CALCIUM 80 MG/1
80 TABLET, FILM COATED ORAL DAILY
Qty: 90 TABLET | Refills: 3 | Status: SHIPPED | OUTPATIENT
Start: 2021-11-01 | End: 2022-10-21

## 2021-11-08 ENCOUNTER — HOSPITAL ENCOUNTER (OUTPATIENT)
Age: 66
Discharge: HOME | End: 2021-11-08
Payer: MEDICARE

## 2021-11-08 ENCOUNTER — TELEPHONE (OUTPATIENT)
Dept: ONCOLOGY | Age: 66
End: 2021-11-08

## 2021-11-08 ENCOUNTER — INITIAL CONSULT (OUTPATIENT)
Dept: ONCOLOGY | Age: 66
End: 2021-11-08
Payer: MEDICARE

## 2021-11-08 VITALS
TEMPERATURE: 97.6 F | DIASTOLIC BLOOD PRESSURE: 93 MMHG | WEIGHT: 310.1 LBS | HEIGHT: 77 IN | SYSTOLIC BLOOD PRESSURE: 128 MMHG | HEART RATE: 121 BPM | BODY MASS INDEX: 36.61 KG/M2

## 2021-11-08 DIAGNOSIS — D72.829 LEUKOCYTOSIS, UNSPECIFIED TYPE: Primary | ICD-10-CM

## 2021-11-08 DIAGNOSIS — D75.89 OTHER SPECIFIED DISEASES OF BLOOD AND BLOOD-FORMING ORGANS: ICD-10-CM

## 2021-11-08 DIAGNOSIS — D72.829 LEUKOCYTOSIS, UNSPECIFIED TYPE: ICD-10-CM

## 2021-11-08 LAB
ABSOLUTE EOS #: 0 K/UL (ref 0–0.4)
ABSOLUTE IMMATURE GRANULOCYTE: ABNORMAL K/UL (ref 0–0.3)
ABSOLUTE LYMPH #: 10.24 K/UL (ref 1–4.8)
ABSOLUTE MONO #: 0.27 K/UL (ref 0.1–0.8)
BASOPHILS # BLD: 0 % (ref 0–2)
BASOPHILS ABSOLUTE: 0 K/UL (ref 0–0.2)
DIFFERENTIAL TYPE: ABNORMAL
EOSINOPHILS RELATIVE PERCENT: 0 % (ref 1–4)
HCT VFR BLD CALC: 41.4 % (ref 41–53)
HEMOGLOBIN: 13.9 G/DL (ref 13.5–17.5)
IGA: 96 MG/DL (ref 70–400)
IGG: 770 MG/DL (ref 700–1600)
IGM: 63 MG/DL (ref 40–230)
IMMATURE GRANULOCYTES: ABNORMAL %
LACTATE DEHYDROGENASE: 140 U/L (ref 135–225)
LYMPHOCYTES # BLD: 77 % (ref 24–44)
MCH RBC QN AUTO: 29.4 PG (ref 26–34)
MCHC RBC AUTO-ENTMCNC: 33.4 G/DL (ref 31–37)
MCV RBC AUTO: 88.1 FL (ref 80–100)
MONOCYTES # BLD: 2 % (ref 1–7)
MORPHOLOGY: ABNORMAL
NRBC AUTOMATED: ABNORMAL PER 100 WBC
PDW BLD-RTO: 13 % (ref 12.5–15.4)
PLATELET # BLD: 280 K/UL (ref 140–450)
PLATELET ESTIMATE: ABNORMAL
PMV BLD AUTO: 7.7 FL (ref 6–12)
RBC # BLD: 4.71 M/UL (ref 4.5–5.9)
RBC # BLD: ABNORMAL 10*6/UL
SEG NEUTROPHILS: 21 % (ref 36–66)
SEGMENTED NEUTROPHILS ABSOLUTE COUNT: 2.79 K/UL (ref 1.8–7.7)
WBC # BLD: 13.3 K/UL (ref 3.5–11)
WBC # BLD: ABNORMAL 10*3/UL

## 2021-11-08 PROCEDURE — 81270 JAK2 GENE: CPT

## 2021-11-08 PROCEDURE — G8427 DOCREV CUR MEDS BY ELIG CLIN: HCPCS | Performed by: INTERNAL MEDICINE

## 2021-11-08 PROCEDURE — G8417 CALC BMI ABV UP PARAM F/U: HCPCS | Performed by: INTERNAL MEDICINE

## 2021-11-08 PROCEDURE — 82784 ASSAY IGA/IGD/IGG/IGM EACH: CPT

## 2021-11-08 PROCEDURE — 88185 FLOWCYTOMETRY/TC ADD-ON: CPT

## 2021-11-08 PROCEDURE — 83615 LACTATE (LD) (LDH) ENZYME: CPT

## 2021-11-08 PROCEDURE — 99202 OFFICE O/P NEW SF 15 MIN: CPT | Performed by: INTERNAL MEDICINE

## 2021-11-08 PROCEDURE — G8484 FLU IMMUNIZE NO ADMIN: HCPCS | Performed by: INTERNAL MEDICINE

## 2021-11-08 PROCEDURE — 85025 COMPLETE CBC W/AUTO DIFF WBC: CPT

## 2021-11-08 PROCEDURE — 88184 FLOWCYTOMETRY/ TC 1 MARKER: CPT

## 2021-11-08 PROCEDURE — 36415 COLL VENOUS BLD VENIPUNCTURE: CPT

## 2021-11-08 PROCEDURE — 99205 OFFICE O/P NEW HI 60 MIN: CPT | Performed by: INTERNAL MEDICINE

## 2021-11-08 PROCEDURE — 81206 BCR/ABL1 GENE MAJOR BP: CPT

## 2021-11-08 RX ORDER — LOSARTAN POTASSIUM 100 MG/1
TABLET ORAL
COMMUNITY
Start: 2021-11-01 | End: 2022-10-17 | Stop reason: SDUPTHER

## 2021-11-08 NOTE — TELEPHONE ENCOUNTER
AVS from 11/8/21    Labs soon  RV or virtual visit after tests results    Pt will have labs drawn after md visit    RVV scheduled 11/22/21 @ 4:30pm    PT was given AVS and an appt schedule    Electronically signed by Brittney Dalal on 11/8/2021 at 11:51 AM

## 2021-11-08 NOTE — PROGRESS NOTES
_               Mr. Stacia Aase is a very pleasant 61 y.o. male with history of multiple co morbidities as listed. Patient is referred for evaluation of leukocytosis. Patient had coronary artery disease history of acute MI. History of chronic back problems. He had routine labs twice yearly. Most recent labs showed white blood cells of 18.8. Differential was not done. Hemoglobin and platelets were normal.  Patient has no symptoms related to this problem. No history of fever or night sweats. No weight loss or decreased appetite. Actually he is gaining weight. Patient denies feeling lumps or bumps or enlarged lymph nodes. No repeated infections. His main complaints is related to tiredness and fatigue. Patient denies smoking or heavy alcohol drinking. Bryn Laguerre PAST MEDICAL HISTORY: has a past medical history of Arthritis, Chronic back pain, Chronic pain, Colon polyps, Degenerative disk disease, Failure of spinal fusion, Fractures, Hepatitis B, Hyperlipidemia, Hypertension, Irregular heart beat, Neuropathy, and Spinal stenosis. PAST SURGICAL HISTORY: has a past surgical history that includes Tonsillectomy (1965); tendon manipulation (Left, 1966); Nerve Block (6-5-13); back surgery (7/2012); back surgery (1/7/11); back surgery (8/3/09); back surgery (10/1/08); and Colonoscopy (8/1/2014). CURRENT MEDICATIONS:  has a current medication list which includes the following prescription(s): losartan, omeprazole, atorvastatin, metoprolol succinate, rivaroxaban, aspirin, and multiple vitamin. ALLERGIES:  has No Known Allergies. FAMILY HISTORY: Negative for any hematological or oncological conditions. SOCIAL HISTORY:  reports that he has never smoked. He has never used smokeless tobacco. He reports current alcohol use of about 6.0 standard drinks of alcohol per week. He reports that he does not use drugs.     REVIEW OF SYSTEMS:     · General: Positive for weakness and fatigue. No unanticipated weight loss or decreased appetite. No fever or chills. · Eyes: No blurred vision, eye pain or double vision. · Ears: No hearing problems or drainage. No tinnitus. · Throat: No sore throat, problems with swallowing or dysphagia. · Respiratory: No cough, sputum or hemoptysis. No shortness of breath. No pleuritic chest pain. · Cardiovascular: No chest pain, orthopnea or PND. No lower extremity edema. No palpitation. · Gastrointestinal: No problems with swallowing. No abdominal pain or bloating. No nausea or vomiting. No diarrhea or constipation. No GI bleeding. · Genitourinary: No dysuria, hematuria, frequency or urgency. · Musculoskeletal: No muscle aches or pains. No limitation of movement. No back pain. No gait disturbance, No joint complaints. · Dermatologic: No skin rashes or pruritus. No skin lesions or discolorations. · Psychiatric: No depression, anxiety, or stress or signs of schizophrenia. No change in mood or affect. · Hematologic: No history of bleeding tendency. No bruises or ecchymosis. No history of clotting problems. · Infectious disease: No fever, chills or frequent infections. · Endocrine: No polydipsia or polyuria. No temperature intolerance. · Neurologic: No headaches or dizziness. No weakness or numbness of the extremities. No changes in balance, coordination,  memory, mentation, behavior. · Allergic/Immunologic: No nasal congestion or hives. No repeated infections. PHYSICAL EXAM:  The patient is not in acute distress. Vital signs: Blood pressure (!) 128/93, pulse 121, temperature 97.6 °F (36.4 °C), temperature source Temporal, height 6' 5\" (1.956 m), weight (!) 310 lb 1.6 oz (140.7 kg).      General appearance - well appearing, not in pain or distress  Mental status - good mood, alert and oriented  Eyes - pupils equal and reactive, extraocular eye movements intact  Ears - bilateral TM's and external ear canals normal  Nose - normal and patent, no erythema, discharge or polyps  Mouth - mucous membranes moist, pharynx normal without lesions  Neck - supple, no significant adenopathy  Lymphatics - no palpable lymphadenopathy, no hepatosplenomegaly  Chest - clear to auscultation, no wheezes, rales or rhonchi, symmetric air entry  Heart - normal rate, regular rhythm, normal S1, S2, no murmurs, rubs, clicks or gallops  Abdomen - soft, nontender, nondistended, no masses or organomegaly  Neurological - alert, oriented, normal speech, no focal findings or movement disorder noted  Musculoskeletal - no joint tenderness, deformity or swelling  Extremities - peripheral pulses normal, no pedal edema, no clubbing or cyanosis  Skin - normal coloration and turgor, no rashes, no suspicious skin lesions noted     Review of Diagnostic data:   Lab Results   Component Value Date    WBC 18.8 (H) 10/07/2021    HGB 14.2 10/07/2021    HCT 44.2 10/07/2021    MCV 92.7 10/07/2021     10/07/2021       Chemistry        Component Value Date/Time     10/07/2021 0944    K 4.9 10/07/2021 0944    CL 99 10/07/2021 0944    CO2 20 10/07/2021 0944    BUN 16 10/07/2021 0944    CREATININE 1.05 10/07/2021 0944        Component Value Date/Time    CALCIUM 9.3 10/07/2021 0944    ALKPHOS 104 10/07/2021 0944    AST 18 10/07/2021 0944    ALT 18 10/07/2021 0944    BILITOT 0.34 10/07/2021 0944            IMPRESSION:   Leukocytosis  History of lymphocytosis on the blood test from 2014  Multiple comorbidities as listed    PLAN: I reviewed the labs available to me and discussed with the patient. I explained to the patient the nature of this hematologic problem. I explained the significance of these abnormalities in layman language. Await the patient is presenting with significant leukocytosis with no signs of infections. Unfortunately differential was not done.   However looking further at previous lab test patient has chronic leukocytosis with white blood cell differential in 2014 showing lymphocytosis of about 7000. The consistency of these leukocytosis with lymphocytosis is concerning for possible underlying CLL. I explained to the patient that diagnosis not established yet but there is a need for further work-up to rule out different causes. I will check JAK2 gene mutation and flow cytometry and will do BCR ABL and LDH. I will do CBC with differential.  I will check immunoglobulin level as well. We will make further recommendations based on the results. If CLL is confirmed, this is probably a early stage, stage 0 and monitoring would be appropriate. However we will wait for results to make final recommendations. Patient's questions were answered to the best of his satisfaction and he verbalized full understanding and agreement.

## 2021-11-09 ENCOUNTER — HOSPITAL ENCOUNTER (OUTPATIENT)
Age: 66
Discharge: HOME | End: 2021-11-09
Payer: MEDICARE

## 2021-11-12 LAB
FLOW CYTOMETRY BL: NORMAL
SURGICAL PATHOLOGY REPORT: NORMAL

## 2021-11-17 LAB
JAK2 QNT, SOURCE: NORMAL
V617 MUTATION, PERCENT: 0 %
V617F MUTATION, QNT: NOT DETECTED

## 2021-11-18 LAB
BCR-ABL QUANTITATIVE: NOT DETECTED
BCR-ABL1, PERCENT: 0 %
BCR/ABL SOURCE: NORMAL
EER BCR-ABL1, MAJOR: NORMAL

## 2021-11-22 ENCOUNTER — VIRTUAL VISIT (OUTPATIENT)
Dept: ONCOLOGY | Age: 66
End: 2021-11-22
Payer: MEDICARE

## 2021-11-22 DIAGNOSIS — C91.10 CLL (CHRONIC LYMPHOCYTIC LEUKEMIA) (HCC): Primary | ICD-10-CM

## 2021-11-22 PROCEDURE — G8427 DOCREV CUR MEDS BY ELIG CLIN: HCPCS | Performed by: INTERNAL MEDICINE

## 2021-11-22 PROCEDURE — 3017F COLORECTAL CA SCREEN DOC REV: CPT | Performed by: INTERNAL MEDICINE

## 2021-11-22 PROCEDURE — 99214 OFFICE O/P EST MOD 30 MIN: CPT | Performed by: INTERNAL MEDICINE

## 2021-11-22 NOTE — PROGRESS NOTES
_           2021    TELEHEALTH EVALUATION -- Audio/Visual (During NWPGB-84 public health emergency)    HPI:    Ludy Celeste (:  1955) has requested an audio/video evaluation for the following concern(s):    Newly diagnosed CLL      An electronic signature was used to authenticate this note. Chief Complaint   Patient presents with    Follow-up    Abdominal Pain     complains stomach hurts        DIAGNOSIS:       Stage 0 CLL   leukocytosis with lymphocytosis on the blood test from  secondary to CLL  Multiple comorbidities as listed    CURRENT THERAPY:         No indications to start treatment for CLL  BRIEF CASE HISTORY:      Mr. Kay Brito is a very pleasant 61 y.o. male with history of multiple co morbidities as listed. Patient is referred for evaluation of leukocytosis. Patient had coronary artery disease history of acute MI. History of chronic back problems. He had routine labs twice yearly. Most recent labs showed white blood cells of 18.8. Differential was not done. Hemoglobin and platelets were normal.  Patient has no symptoms related to this problem. No history of fever or night sweats. No weight loss or decreased appetite. Actually he is gaining weight. Patient denies feeling lumps or bumps or enlarged lymph nodes. No repeated infections. His main complaints is related to tiredness and fatigue. Patient denies smoking or heavy alcohol drinking. .     INTERIM HISTORY:   Patient seen for follow-up leukocytosis. He had evidence of persistent lymphocytosis. We suspected CLL. Flow cytometry was done. It confirmed diagnosis of CLL. Patient denies any fever or night sweats. No weight loss or decreased appetite. No enlarged lymph nodes.     PAST MEDICAL HISTORY: has a past medical history of Arthritis, Chronic back pain, Chronic pain, Colon polyps, Degenerative disk disease, Failure of spinal fusion, Fractures, Hepatitis B, Hyperlipidemia, Hypertension, Irregular heart beat, Neuropathy, and Spinal stenosis. PAST SURGICAL HISTORY: has a past surgical history that includes Tonsillectomy (1965); tendon manipulation (Left, 1966); Nerve Block (6-5-13); back surgery (7/2012); back surgery (1/7/11); back surgery (8/3/09); back surgery (10/1/08); and Colonoscopy (8/1/2014). CURRENT MEDICATIONS:  has a current medication list which includes the following prescription(s): pantoprazole sodium, losartan, atorvastatin, metoprolol succinate, rivaroxaban, aspirin, multiple vitamin, and omeprazole. ALLERGIES:  has No Known Allergies. FAMILY HISTORY: Negative for any hematological or oncological conditions. SOCIAL HISTORY:  reports that he has never smoked. He has never used smokeless tobacco. He reports current alcohol use of about 6.0 standard drinks of alcohol per week. He reports that he does not use drugs. REVIEW OF SYSTEMS:     · General: Positive for weakness and fatigue. No unanticipated weight loss or decreased appetite. No fever or chills. · Eyes: No blurred vision, eye pain or double vision. · Ears: No hearing problems or drainage. No tinnitus. · Throat: No sore throat, problems with swallowing or dysphagia. · Respiratory: No cough, sputum or hemoptysis. No shortness of breath. No pleuritic chest pain. · Cardiovascular: No chest pain, orthopnea or PND. No lower extremity edema. No palpitation. · Gastrointestinal: No problems with swallowing. No abdominal pain or bloating. No nausea or vomiting. No diarrhea or constipation. No GI bleeding. · Genitourinary: No dysuria, hematuria, frequency or urgency. · Musculoskeletal: No muscle aches or pains. No limitation of movement. No back pain. No gait disturbance, No joint complaints. · Dermatologic: No skin rashes or pruritus. No skin lesions or discolorations.    · Psychiatric: No depression, anxiety, or stress or signs of schizophrenia. No change in mood or affect. · Hematologic: No history of bleeding tendency. No bruises or ecchymosis. No history of clotting problems. · Infectious disease: No fever, chills or frequent infections. · Endocrine: No polydipsia or polyuria. No temperature intolerance. · Neurologic: No headaches or dizziness. No weakness or numbness of the extremities. No changes in balance, coordination,  memory, mentation, behavior. · Allergic/Immunologic: No nasal congestion or hives. No repeated infections. PHYSICAL EXAMINATION:  [ INSTRUCTIONS:  \"[x]\" Indicates a positive item  \"[]\" Indicates a negative item  -- DELETE ALL ITEMS NOT EXAMINED]  Vital Signs: (As obtained by patient/caregiver or practitioner observation)    Blood pressure-  Heart rate-    Respiratory rate-    Temperature-  Pulse oximetry-     Constitutional: [x] Appears well-developed and well-nourished [x] No apparent distress      [] Abnormal-   Mental status  [x] Alert and awake  [x] Oriented to person/place/time [x]Able to follow commands      Eyes:  EOM    [x]  Normal  [] Abnormal-  Sclera  [x]  Normal  [] Abnormal -         Discharge [x]  None visible  [] Abnormal -    HENT:   [x] Normocephalic, atraumatic.   [] Abnormal   [x] Mouth/Throat: Mucous membranes are moist.     External Ears [x] Normal  [] Abnormal-     Neck: [x] No visualized mass     Pulmonary/Chest: [x] Respiratory effort normal.  [x] No visualized signs of difficulty breathing or respiratory distress        [] Abnormal-      Musculoskeletal:   [] Normal gait with no signs of ataxia         [x] Normal range of motion of neck        [] Abnormal-       Neurological:        [x] No Facial Asymmetry (Cranial nerve 7 motor function) (limited exam to video visit)          [x] No gaze palsy        [] Abnormal-         Skin:        [x] No significant exanthematous lesions or discoloration noted on facial skin         [] Abnormal-            Psychiatric:       [x] Normal Affect [x] No Hallucinations        [] Abnormal-     Other pertinent observable physical exam findings-     Review of Diagnostic data:   Lab Results   Component Value Date    WBC 13.3 (H) 11/08/2021    HGB 13.9 11/08/2021    HCT 41.4 11/08/2021    MCV 88.1 11/08/2021     11/08/2021       Chemistry        Component Value Date/Time     10/07/2021 0944    K 4.9 10/07/2021 0944    CL 99 10/07/2021 0944    CO2 20 10/07/2021 0944    BUN 16 10/07/2021 0944    CREATININE 1.05 10/07/2021 0944        Component Value Date/Time    CALCIUM 9.3 10/07/2021 0944    ALKPHOS 104 10/07/2021 0944    AST 18 10/07/2021 0944    ALT 18 10/07/2021 0944    BILITOT 0.34 10/07/2021 0944        Peripheral blood flow cytometry is positive for CLL    IMPRESSION:   Stage 0 CLL   leukocytosis with lymphocytosis on the blood test from 2014 secondary to CLL  Multiple comorbidities as listed    PLAN: I reviewed the labs as above and discussed with the patient. I explained to the patient the nature of this hematologic problem. I explained the significance of these abnormalities in layman language. Patient was seen because of significant leukocytosis with no signs of infections. Further evaluation was noted to have persistent lymphocytosis. Flow cytometry showed positive CLL. I explained to the patient the nature of CLL, staging, prognosis and treatment. At the present time patient does not have any B symptoms and he has no significant lymphadenopathy or splenomegaly. He is hemoglobin and platelets are normal.  He is having stage 0 CLL. I explained to the patient that standard of care is not to treat CLL unless we see significant flareup. He was educated about symptoms and signs of CLL flareup so he can watch for and he can call us for any abnormalities. Patient will have continued monitoring with labs every 3 to 6 months for consideration of initiating treatment if we see any significant changes.   Patient's questions were answered to the best of his satisfaction and he verbalized full understanding and agreement. Yury Spainyoni, was evaluated through a synchronous (real-time) audio-video encounter. The patient (or guardian if applicable) is aware that this is a billable service. Verbal consent to proceed has been obtained within the past 12 months. The visit was conducted pursuant to the emergency declaration under the [address] [place] [address], [address] [place] [address] authority and the [employer] [place] Act. Patient identification was verified, and a caregiver was present when appropriate. The patient was located in a state where the provider was credentialed to provide care.     Total time spent on this encounter: Not billed by time    --Kieran Anderson MD on 11/22/2021 at 6:39 PM

## 2021-11-29 ENCOUNTER — TELEPHONE (OUTPATIENT)
Dept: ONCOLOGY | Age: 66
End: 2021-11-29

## 2021-11-29 NOTE — TELEPHONE ENCOUNTER
AVS from 11/22/21    RV 3-4 months with CBC, LDH at RV    RV scheduled 4/4/22 @ 11:30am    PT was given AVS and an appt schedule    Electronically signed by Darryl Red on 11/29/2021 at 1:04 PM

## 2022-01-26 RX ORDER — METOPROLOL SUCCINATE 100 MG/1
100 TABLET, EXTENDED RELEASE ORAL DAILY
Qty: 90 TABLET | Refills: 3 | Status: SHIPPED | OUTPATIENT
Start: 2022-01-26

## 2022-01-26 RX ORDER — PANTOPRAZOLE SODIUM 40 MG/1
40 TABLET, DELAYED RELEASE ORAL DAILY
Qty: 30 TABLET | Refills: 3 | Status: SHIPPED | OUTPATIENT
Start: 2022-01-26 | End: 2022-04-07

## 2022-04-04 ENCOUNTER — HOSPITAL ENCOUNTER (OUTPATIENT)
Age: 67
Discharge: HOME | End: 2022-04-04
Payer: MEDICARE

## 2022-04-04 ENCOUNTER — TELEPHONE (OUTPATIENT)
Dept: ONCOLOGY | Age: 67
End: 2022-04-04

## 2022-04-04 ENCOUNTER — OFFICE VISIT (OUTPATIENT)
Dept: ONCOLOGY | Age: 67
End: 2022-04-04
Payer: MEDICARE

## 2022-04-04 VITALS
BODY MASS INDEX: 35.01 KG/M2 | HEART RATE: 86 BPM | DIASTOLIC BLOOD PRESSURE: 85 MMHG | TEMPERATURE: 96.9 F | SYSTOLIC BLOOD PRESSURE: 146 MMHG | WEIGHT: 295.2 LBS

## 2022-04-04 DIAGNOSIS — C91.10 CLL (CHRONIC LYMPHOCYTIC LEUKEMIA) (HCC): Primary | ICD-10-CM

## 2022-04-04 DIAGNOSIS — C91.10 CLL (CHRONIC LYMPHOCYTIC LEUKEMIA) (HCC): ICD-10-CM

## 2022-04-04 LAB
ABSOLUTE EOS #: 0.18 K/UL (ref 0–0.4)
ABSOLUTE LYMPH #: 13.12 K/UL (ref 1–4.8)
ABSOLUTE MONO #: 0.7 K/UL (ref 0.1–0.8)
ALBUMIN SERPL-MCNC: 4.5 G/DL (ref 3.5–5.2)
ALP BLD-CCNC: 93 U/L (ref 40–129)
ALT SERPL-CCNC: 14 U/L (ref 5–41)
AST SERPL-CCNC: 16 U/L
BASOPHILS # BLD: 0 % (ref 0–2)
BASOPHILS ABSOLUTE: 0 K/UL (ref 0–0.2)
CHOLESTEROL/HDL RATIO: 3.8
CHOLESTEROL: 165 MG/DL
EOSINOPHILS RELATIVE PERCENT: 1 % (ref 1–4)
HCT VFR BLD CALC: 42.4 % (ref 41–53)
HDLC SERPL-MCNC: 44 MG/DL
HEMOGLOBIN: 14.2 G/DL (ref 13.5–17.5)
LACTATE DEHYDROGENASE: 157 U/L (ref 135–225)
LDL CHOLESTEROL: 83 MG/DL (ref 0–130)
LYMPHOCYTES # BLD: 75 % (ref 24–44)
MCH RBC QN AUTO: 30 PG (ref 26–34)
MCHC RBC AUTO-ENTMCNC: 33.5 G/DL (ref 31–37)
MCV RBC AUTO: 89.5 FL (ref 80–100)
MONOCYTES # BLD: 4 % (ref 1–7)
MORPHOLOGY: ABNORMAL
PDW BLD-RTO: 13.7 % (ref 12.5–15.4)
PLATELET # BLD: 288 K/UL (ref 140–450)
PMV BLD AUTO: 7.7 FL (ref 6–12)
RBC # BLD: 4.74 M/UL (ref 4.5–5.9)
SEG NEUTROPHILS: 20 % (ref 36–66)
SEGMENTED NEUTROPHILS ABSOLUTE COUNT: 3.5 K/UL (ref 1.8–7.7)
TRIGL SERPL-MCNC: 189 MG/DL
WBC # BLD: 17.5 K/UL (ref 3.5–11)

## 2022-04-04 PROCEDURE — 84460 ALANINE AMINO (ALT) (SGPT): CPT

## 2022-04-04 PROCEDURE — 82040 ASSAY OF SERUM ALBUMIN: CPT

## 2022-04-04 PROCEDURE — G8417 CALC BMI ABV UP PARAM F/U: HCPCS | Performed by: INTERNAL MEDICINE

## 2022-04-04 PROCEDURE — 99211 OFF/OP EST MAY X REQ PHY/QHP: CPT | Performed by: INTERNAL MEDICINE

## 2022-04-04 PROCEDURE — 83615 LACTATE (LD) (LDH) ENZYME: CPT

## 2022-04-04 PROCEDURE — 3017F COLORECTAL CA SCREEN DOC REV: CPT | Performed by: INTERNAL MEDICINE

## 2022-04-04 PROCEDURE — 85025 COMPLETE CBC W/AUTO DIFF WBC: CPT

## 2022-04-04 PROCEDURE — 80061 LIPID PANEL: CPT

## 2022-04-04 PROCEDURE — 84450 TRANSFERASE (AST) (SGOT): CPT

## 2022-04-04 PROCEDURE — 99214 OFFICE O/P EST MOD 30 MIN: CPT | Performed by: INTERNAL MEDICINE

## 2022-04-04 PROCEDURE — 84075 ASSAY ALKALINE PHOSPHATASE: CPT

## 2022-04-04 PROCEDURE — 36415 COLL VENOUS BLD VENIPUNCTURE: CPT

## 2022-04-04 PROCEDURE — 1036F TOBACCO NON-USER: CPT | Performed by: INTERNAL MEDICINE

## 2022-04-04 PROCEDURE — G8427 DOCREV CUR MEDS BY ELIG CLIN: HCPCS | Performed by: INTERNAL MEDICINE

## 2022-04-04 RX ORDER — ICOSAPENT ETHYL 1000 MG/1
CAPSULE ORAL
COMMUNITY
Start: 2022-02-25 | End: 2022-10-17

## 2022-04-04 NOTE — PROGRESS NOTES
_             An electronic signature was used to authenticate this note. Chief Complaint   Patient presents with    Follow-up     review status of disease    Discuss Labs       DIAGNOSIS:       Stage 0 CLL   leukocytosis with lymphocytosis on the blood test from 2014 secondary to CLL  Multiple comorbidities as listed    CURRENT THERAPY:         No indications to start treatment for CLL  BRIEF CASE HISTORY:      Mr. Mony Denise is a very pleasant 61 y.o. male with history of multiple co morbidities as listed. Patient is referred for evaluation of leukocytosis. Patient had coronary artery disease history of acute MI. History of chronic back problems. He had routine labs twice yearly. Most recent labs showed white blood cells of 18.8. Differential was not done. Hemoglobin and platelets were normal.  Patient has no symptoms related to this problem. No history of fever or night sweats. No weight loss or decreased appetite. Actually he is gaining weight. Patient denies feeling lumps or bumps or enlarged lymph nodes. No repeated infections. His main complaints is related to tiredness and fatigue. Patient denies smoking or heavy alcohol drinking. .     INTERIM HISTORY:   Patient seen for follow-up CLL. Doing well clinically. No evidence of fever or night sweats. No weight loss or decreased appetite. No enlarged lymph nodes. No repeated infections. No other complaints. PAST MEDICAL HISTORY: has a past medical history of Arthritis, Chronic back pain, Chronic pain, Colon polyps, Degenerative disk disease, Failure of spinal fusion, Fractures, Hepatitis B, Hyperlipidemia, Hypertension, Irregular heart beat, Neuropathy, and Spinal stenosis. PAST SURGICAL HISTORY: has a past surgical history that includes Tonsillectomy (1965); tendon manipulation (Left, 1966);  Nerve Block (6-5-13); back surgery (7/2012); back intolerance. · Neurologic: No headaches or dizziness. No weakness or numbness of the extremities. No changes in balance, coordination,  memory, mentation, behavior. · Allergic/Immunologic: No nasal congestion or hives. No repeated infections. PHYSICAL EXAMINATION:  Blood pressure (!) 146/85, pulse 86, temperature 96.9 °F (36.1 °C), temperature source Temporal, weight 295 lb 3.2 oz (133.9 kg).   General appearance - well appearing, not in pain or distress  Mental status - good mood, alert and oriented  Eyes - pupils equal and reactive, extraocular eye movements intact  Ears - bilateral TM's and external ear canals normal  Nose - normal and patent, no erythema, discharge or polyps  Mouth - mucous membranes moist, pharynx normal without lesions  Neck - supple, no significant adenopathy  Lymphatics - no palpable lymphadenopathy, no hepatosplenomegaly  Chest - clear to auscultation, no wheezes, rales or rhonchi, symmetric air entry  Heart - normal rate, regular rhythm, normal S1, S2, no murmurs, rubs, clicks or gallops  Abdomen - soft, nontender, nondistended, no masses or organomegaly  Neurological - alert, oriented, normal speech, no focal findings or movement disorder noted  Musculoskeletal - no joint tenderness, deformity or swelling  Extremities - peripheral pulses normal, no pedal edema, no clubbing or cyanosis  Skin - normal coloration and turgor, no rashes, no suspicious skin lesions noted     Review of Diagnostic data:   Lab Results   Component Value Date    WBC 17.5 (H) 04/04/2022    HGB 14.2 04/04/2022    HCT 42.4 04/04/2022    MCV 89.5 04/04/2022     04/04/2022       Chemistry        Component Value Date/Time     10/07/2021 0944    K 4.9 10/07/2021 0944    CL 99 10/07/2021 0944    CO2 20 10/07/2021 0944    BUN 16 10/07/2021 0944    CREATININE 1.05 10/07/2021 0944        Component Value Date/Time    CALCIUM 9.3 10/07/2021 0944    ALKPHOS 93 04/04/2022 1137    AST 16 04/04/2022 1137    ALT 14 04/04/2022 1137    BILITOT 0.34 10/07/2021 0944        Peripheral blood flow cytometry is positive for CLL    IMPRESSION:   Stage 0 CLL   leukocytosis with lymphocytosis on the blood test from 2014 secondary to CLL  Multiple comorbidities as listed    PLAN: I reviewed the labs as above and discussed with the patient. I explained to the patient the nature of this hematologic problem. I explained the significance of these abnormalities in layman language. Patient was seen because of significant leukocytosis with no signs of infections. Further evaluation was noted to have persistent lymphocytosis. Flow cytometry showed positive CLL. I explained to the patient the nature of CLL, staging, prognosis and treatment. At the present time patient does not have any B symptoms and he has no significant lymphadenopathy or splenomegaly. He is hemoglobin and platelets are normal.  He is having stage 0 CLL. I explained to the patient that standard of care is not to treat CLL unless we see significant flareup. He was educated about symptoms and signs of CLL flareup so he can watch for and he can call us for any abnormalities. Patient will have continued monitoring with labs every 3 to 6 months for consideration of initiating treatment if we see any significant changes. Patient's questions were answered to the best of his satisfaction and he verbalized full understanding and agreement. [address] [facility]                            This note is created with the assistance of a speech recognition program.  While intending to generate a document that actually reflects the content of the visit, the document can still have some errors including those of syntax and sound a like substitutions which may escape proof reading. It such instances, actual meaning can be extrapolated by contextual diversion.

## 2022-04-04 NOTE — TELEPHONE ENCOUNTER
RV 3-4 months with CBC, LDH at RV    RV scheduled 8/1/22 @ 1pm    PT was given AVS and an appt schedule    Electronically signed by José Manuel Odom on 4/4/2022 at 2:39 PM

## 2022-04-07 ENCOUNTER — OFFICE VISIT (OUTPATIENT)
Dept: PRIMARY CARE CLINIC | Age: 67
End: 2022-04-07
Payer: MEDICARE

## 2022-04-07 VITALS
SYSTOLIC BLOOD PRESSURE: 136 MMHG | HEART RATE: 103 BPM | RESPIRATION RATE: 13 BRPM | OXYGEN SATURATION: 97 % | HEIGHT: 77 IN | BODY MASS INDEX: 34.52 KG/M2 | WEIGHT: 292.4 LBS | DIASTOLIC BLOOD PRESSURE: 84 MMHG

## 2022-04-07 DIAGNOSIS — R10.13 EPIGASTRIC PAIN: ICD-10-CM

## 2022-04-07 DIAGNOSIS — C91.10 CHRONIC LYMPHOCYTIC LEUKEMIA (HCC): ICD-10-CM

## 2022-04-07 DIAGNOSIS — I10 ESSENTIAL HYPERTENSION: Primary | ICD-10-CM

## 2022-04-07 DIAGNOSIS — E66.01 SEVERE OBESITY (BMI 35.0-39.9) WITH COMORBIDITY (HCC): ICD-10-CM

## 2022-04-07 DIAGNOSIS — I48.91 ATRIAL FIBRILLATION, UNSPECIFIED TYPE (HCC): ICD-10-CM

## 2022-04-07 PROBLEM — M79.671 PAIN IN RIGHT FOOT: Status: ACTIVE | Noted: 2022-04-07

## 2022-04-07 PROBLEM — M51.26 DISPLACEMENT OF LUMBAR INTERVERTEBRAL DISC: Status: ACTIVE | Noted: 2022-04-07

## 2022-04-07 PROBLEM — R20.9 SKIN SENSATION DISTURBANCE: Status: ACTIVE | Noted: 2022-04-07

## 2022-04-07 PROCEDURE — G8427 DOCREV CUR MEDS BY ELIG CLIN: HCPCS | Performed by: NURSE PRACTITIONER

## 2022-04-07 PROCEDURE — 3017F COLORECTAL CA SCREEN DOC REV: CPT | Performed by: NURSE PRACTITIONER

## 2022-04-07 PROCEDURE — 99214 OFFICE O/P EST MOD 30 MIN: CPT | Performed by: NURSE PRACTITIONER

## 2022-04-07 PROCEDURE — G8417 CALC BMI ABV UP PARAM F/U: HCPCS | Performed by: NURSE PRACTITIONER

## 2022-04-07 PROCEDURE — 1036F TOBACCO NON-USER: CPT | Performed by: NURSE PRACTITIONER

## 2022-04-07 RX ORDER — PANTOPRAZOLE SODIUM 40 MG/1
40 TABLET, DELAYED RELEASE ORAL DAILY
Qty: 30 TABLET | Refills: 3 | Status: CANCELLED | OUTPATIENT
Start: 2022-04-07

## 2022-04-07 RX ORDER — OMEPRAZOLE 40 MG/1
40 CAPSULE, DELAYED RELEASE ORAL DAILY
Qty: 90 CAPSULE | Refills: 3 | OUTPATIENT
Start: 2022-04-07 | End: 2022-10-17 | Stop reason: SDUPTHER

## 2022-04-07 ASSESSMENT — ENCOUNTER SYMPTOMS
BACK PAIN: 1
COUGH: 0
ABDOMINAL PAIN: 1
SHORTNESS OF BREATH: 0

## 2022-04-07 NOTE — PROGRESS NOTES
[facility] PRIMARY CARE  [address]   [address]  [address] [address]  Dept: [phone]  Dept Fax: [phone]    Maya Ball is a 61 y.o. male who presentstoday for his medical conditions/complaints as noted below. Maya Ball is c/o of  Chief Complaint   Patient presents with    Hypertension    6 Month Follow-Up         HPI:     Presents for 6 month recheck on chronic conditions  BP well controlled  Has lost 18lb since LOV  Has cut back on carbohydrates and watching his diet.  Congratulated patient    Hx of chronic lower back pain  Did not do PT as discussed  Has not noted changed in pain with weight loss     C/o epigastric pain, not relieved with PPI  Recent Upper/lower GI with Dr. Johnathon Benito  Will schedule appt to review with GI    Recent recheck with cardiology and oncology  Completed labs, states no changes    C/o insomnia, related to having to get up throughout the night to urinate  Declines medication  Will continue to monitor     Overall he feels he is doing well  Denies any other problems/concerns        Hemoglobin A1C (%)   Date Value   10/07/2021 6.0   05/18/2018 5.8   01/31/2014 5.7             ( goal A1C is < 7)   No results found for: LABMICR  LDL Cholesterol (mg/dL)   Date Value   04/04/2022 83   10/07/2021        10/22/2020 81       (goal LDL is <100)   AST (U/L)   Date Value   04/04/2022 16     ALT (U/L)   Date Value   04/04/2022 14     BUN (mg/dL)   Date Value   10/07/2021 16     BP Readings from Last 3 Encounters:   04/07/22 136/84   04/04/22 (!) 146/85   11/08/21 (!) 128/93          ([id number])    Past Medical History:   Diagnosis Date    Arthritis     Chronic back pain     Chronic pain     Colon polyps     Degenerative disk disease     Failure of spinal fusion     Fractures     Hx multiple fractures:  bilat ankles, Ribs x3, Nose, Collar bone, fingers, toes thumb    Hepatitis B     Hyperlipidemia     ON RX    Hypertension 2001    ON RX    Irregular heart beat 2012    Neuropathy     BILAT LEGS    Spinal stenosis       Past Surgical History:   Procedure Laterality Date    BACK SURGERY  7/2012    Decompressive laminectomy/ medial w/ medial fecetectomies    BACK SURGERY  1/7/11    Decompressive laminectomy/ lumbar fusion w/ grafts and hardware.  BACK SURGERY  8/3/09    Anterior approach L4-5 spinal fusion    BACK SURGERY  10/1/08    Hemilaminectomy , decompression, discectomy L4-5, S1    COLONOSCOPY  8/1/2014    resection of polyps    NERVE BLOCK  6-5-13    DURAMORPH EPIDURAL STEROID BLOCK  CELESTONE 6 MORPHINE 1.5 MG    TENDON MANIPULATION Left 1966    Tendon & laceration repair forearm    TONSILLECTOMY  1965       Family History   Problem Relation Age of Onset    Heart Attack Father         passed away of massive MI @ 40    Heart Disease Father 40        MI'S AND OPEN HEART    Cancer Mother         age 70, lung CA    Cancer Maternal Uncle         PROSTATE    Heart Disease Paternal Aunt         OPEN HEART    Cancer Maternal Grandmother     Heart Disease Paternal Grandfather 40        MI          Social History     Tobacco Use    Smoking status: Never Smoker    Smokeless tobacco: Never Used   Substance Use Topics    Alcohol use:  Yes     Alcohol/week: 6.0 standard drinks     Types: 6 Cans of beer per week     Comment: usually weekends      Current Outpatient Medications   Medication Sig Dispense Refill    omeprazole (PRILOSEC) 40 MG delayed release capsule Take 1 capsule by mouth daily 90 capsule 3    VASCEPA 1 g CAPS capsule       rivaroxaban (XARELTO) 20 MG TABS tablet TAKE 1 TABLET BY MOUTH DAILY WITH SUPPER 30 tablet 3    metoprolol succinate (TOPROL XL) 100 MG extended release tablet Take 1 tablet by mouth daily 90 tablet 3    losartan (COZAAR) 100 MG tablet       atorvastatin (LIPITOR) 80 MG tablet TAKE 1 TABLET BY MOUTH DAILY 90 tablet 3    aspirin 81 MG chewable tablet Take 1 tablet by mouth daily 90 tablet 3    Multiple Vitamins-Minerals (MULTIVITAMIN PO) Take 1 tablet by mouth daily. No current facility-administered medications for this visit. No Known Allergies    Health Maintenance   Topic Date Due    Pneumococcal 0-64 years Vaccine (1 of 4 - PCV13) Never done    COVID-19 Vaccine (3 - Pfizer risk 4-dose series) 05/24/2021    DTaP/Tdap/Td vaccine (1 - Tdap) 04/28/2022 (Originally 10/16/1976)    Flu vaccine (Season Ended) 10/07/2022 (Originally 9/1/2022)    Shingles Vaccine (1 of 2) 10/07/2022 (Originally 10/16/2007)    A1C test (Diabetic or Prediabetic)  10/07/2022    Depression Screen  10/07/2022    Potassium monitoring  10/07/2022    Creatinine monitoring  10/07/2022    Annual Wellness Visit (AWV)  10/08/2022    Lipid screen  04/04/2023    Colorectal Cancer Screen  12/13/2031    Hepatitis A vaccine  Aged Out    Hepatitis B vaccine  Aged Out    Hib vaccine  Aged Out    Meningococcal (ACWY) vaccine  Aged Out    Hepatitis C screen  Discontinued    HIV screen  Discontinued       Subjective:      Review of Systems   Constitutional: Negative for chills, fatigue and fever. HENT: Negative for congestion. Eyes: Negative for visual disturbance. Respiratory: Negative for cough and shortness of breath. Cardiovascular: Negative for chest pain and palpitations. Gastrointestinal: Positive for abdominal pain. Genitourinary: Negative for difficulty urinating and dysuria. Musculoskeletal: Positive for back pain. Negative for arthralgias. Neurological: Negative for dizziness and headaches. Psychiatric/Behavioral: Negative for self-injury, sleep disturbance and suicidal ideas. The patient is not nervous/anxious. Objective:     Physical Exam  Vitals and nursing note reviewed. Constitutional:       Appearance: He is well-developed. HENT:      Head: Normocephalic and atraumatic. Eyes:      Pupils: Pupils are equal, round, and reactive to light.    Cardiovascular:      Rate and Rhythm: Normal rate. Rhythm irregularly irregular. Heart sounds: Normal heart sounds. Pulmonary:      Effort: Pulmonary effort is normal.      Breath sounds: Normal breath sounds. Abdominal:      General: Bowel sounds are normal.      Palpations: Abdomen is soft. Tenderness: There is no abdominal tenderness. Musculoskeletal:         General: Normal range of motion. Cervical back: Normal range of motion. Skin:     General: Skin is warm and dry. Neurological:      Mental Status: He is alert and oriented to person, place, and time. Psychiatric:         Behavior: Behavior normal.         Thought Content: Thought content normal.         Judgment: Judgment normal.       /84   Pulse 103   Resp 13   Ht 6' 5\" (1.956 m)   Wt 292 lb 6.4 oz (132.6 kg)   SpO2 97%   BMI 34.67 kg/m²     Assessment:       Diagnosis Orders   1. Essential hypertension     2. Atrial fibrillation, unspecified type (HCC)     3. Epigastric pain     4. Chronic lymphocytic leukemia ([facility]     5. Severe obesity (BMI 35.0-39. 9) with comorbidity ([facility]               Plan:      Return in about 6 months (around 10/7/2022) for AWV. 1. Chronic conditions- Stable. Continue diet/exercise. Continue current meds. Continue with specialists as above. Follow up in six months for recheck/AWV. 2. Epigastric pain- No improvement with PPI use, advised to contact GI to review. Follow up as needed. Orders Placed This Encounter   Medications    omeprazole (PRILOSEC) 40 MG delayed release capsule     Sig: Take 1 capsule by mouth daily     Dispense:  90 capsule     Refill:  3       Patient given educational materials - see patient instructions. Discussed use, benefit, and side effects of prescribed medications. All patientquestions answered. Pt voiced understanding. Reviewed health maintenance. Instructedto continue current medications, diet and exercise. Patient agreed with treatmentplan. Follow up as directed. Electronicallysigned by ANCA Vazquez CNP on 4/7/2022 at 8:14 AM

## 2022-04-28 DIAGNOSIS — M25.511 RIGHT SHOULDER PAIN, UNSPECIFIED CHRONICITY: Primary | ICD-10-CM

## 2022-04-29 ENCOUNTER — OFFICE VISIT (OUTPATIENT)
Dept: ORTHOPEDIC SURGERY | Age: 67
End: 2022-04-29
Payer: MEDICARE

## 2022-04-29 DIAGNOSIS — M19.011 OSTEOARTHRITIS OF RIGHT ACROMIOCLAVICULAR JOINT: Primary | ICD-10-CM

## 2022-04-29 PROCEDURE — 1036F TOBACCO NON-USER: CPT | Performed by: FAMILY MEDICINE

## 2022-04-29 PROCEDURE — G8427 DOCREV CUR MEDS BY ELIG CLIN: HCPCS | Performed by: FAMILY MEDICINE

## 2022-04-29 PROCEDURE — 20611 DRAIN/INJ JOINT/BURSA W/US: CPT | Performed by: FAMILY MEDICINE

## 2022-04-29 PROCEDURE — 99213 OFFICE O/P EST LOW 20 MIN: CPT | Performed by: FAMILY MEDICINE

## 2022-04-29 PROCEDURE — 3017F COLORECTAL CA SCREEN DOC REV: CPT | Performed by: FAMILY MEDICINE

## 2022-04-29 PROCEDURE — G8417 CALC BMI ABV UP PARAM F/U: HCPCS | Performed by: FAMILY MEDICINE

## 2022-04-29 RX ORDER — BUPIVACAINE HYDROCHLORIDE 5 MG/ML
1 INJECTION, SOLUTION PERINEURAL ONCE
Status: COMPLETED | OUTPATIENT
Start: 2022-04-29 | End: 2022-04-29

## 2022-04-29 RX ORDER — TRIAMCINOLONE ACETONIDE 40 MG/ML
40 INJECTION, SUSPENSION INTRA-ARTICULAR; INTRAMUSCULAR ONCE
Status: COMPLETED | OUTPATIENT
Start: 2022-04-29 | End: 2022-04-29

## 2022-04-29 RX ADMIN — TRIAMCINOLONE ACETONIDE 40 MG: 40 INJECTION, SUSPENSION INTRA-ARTICULAR; INTRAMUSCULAR at 13:41

## 2022-04-29 RX ADMIN — BUPIVACAINE HYDROCHLORIDE 5 MG: 5 INJECTION, SOLUTION PERINEURAL at 13:40

## 2022-04-29 NOTE — PROGRESS NOTES
Sports Medicine Consultation    CHIEF COMPLAINT:  Shoulder Pain (Right f/u. no new injury. having more anterior pain today. )        HPI:   The patient is a 61 y.o. male who is being seen as a  established patient being seen for regarding recurrence of a previously resolved problem r shoulder pain. The patient is a right hand dominant male who has had shoulder pain for months. As far as trauma to the shoulder, the patient indicates no new. The pain is  worse at night and when doing overhead activities. Weakness of the shoulder has not  been noted. The pain restricts activities such as nothing just hurts. The pain does not seem to improve with time. The following medications and interventions have been tried: cortisone in the past, heat with benefit. Physical Therapy has been tried. Corticosteroid injection has been done. Neck pain has not been present. Numbness and/or tingling has not been present. he has a past medical history of Arthritis, Chronic back pain, Chronic pain, Colon polyps, Degenerative disk disease, Failure of spinal fusion, Fractures, Hepatitis B, Hyperlipidemia, Hypertension, Irregular heart beat, Neuropathy, and Spinal stenosis. he has a past surgical history that includes Tonsillectomy (1965); tendon manipulation (Left, 1966); Nerve Block (6-5-13); back surgery (7/2012); back surgery (1/7/11); back surgery (8/3/09); back surgery (10/1/08); and Colonoscopy (8/1/2014).     Past Medical History:   Diagnosis Date    Arthritis     Chronic back pain     Chronic pain     Colon polyps     Degenerative disk disease     Failure of spinal fusion     Fractures     Hx multiple fractures:  bilat ankles, Ribs x3, Nose, Collar bone, fingers, toes thumb    Hepatitis B     Hyperlipidemia     ON RX    Hypertension 2001    ON RX    Irregular heart beat 2012    Neuropathy     BILAT LEGS    Spinal stenosis        Past Surgical History:   Procedure Laterality Date    BACK SURGERY 7/2012    Decompressive laminectomy/ medial w/ medial fecetectomies    BACK SURGERY  1/7/11    Decompressive laminectomy/ lumbar fusion w/ grafts and hardware.  BACK SURGERY  8/3/09    Anterior approach L4-5 spinal fusion    BACK SURGERY  10/1/08    Hemilaminectomy , decompression, discectomy L4-5, S1    COLONOSCOPY  8/1/2014    resection of polyps    NERVE BLOCK  6-5-13    DURAMORPH EPIDURAL STEROID BLOCK  CELESTONE 6 MORPHINE 1.5 MG    TENDON MANIPULATION Left 1966    Tendon & laceration repair forearm    TONSILLECTOMY  1965       family history includes Cancer in his maternal grandmother, maternal uncle, and mother; Heart Attack in his father; Heart Disease in his paternal aunt; Heart Disease (age of onset: 40) in his father and paternal grandfather. Social History     Socioeconomic History    Marital status: Single     Spouse name: Not on file    Number of children: Not on file    Years of education: Not on file    Highest education level: Not on file   Occupational History    Not on file   Tobacco Use    Smoking status: Never Smoker    Smokeless tobacco: Never Used   Substance and Sexual Activity    Alcohol use: Yes     Alcohol/week: 6.0 standard drinks     Types: 6 Cans of beer per week     Comment: usually weekends    Drug use: No    Sexual activity: Not on file   Other Topics Concern    Not on file   Social History Narrative    Not on file     Social Determinants of Health     Financial Resource Strain: Low Risk     Difficulty of Paying Living Expenses: Not hard at all   Food Insecurity: No Food Insecurity    Worried About [address] in the Last Year: Never true    [address] in the Last Year: Never true   Transportation Needs:     Lack of Transportation (Medical): Not on file    Lack of Transportation (Non-Medical):  Not on file   Physical Activity:     Days of Exercise per Week: Not on file    Minutes of Exercise per Session: Not on file   Stress:     Feeling of Stress : Not on file   Social Connections:     Frequency of Communication with Friends and Family: Not on file    Frequency of Social Gatherings with Friends and Family: Not on file    Attends Worship Services: Not on file    Active Member of Clubs or Organizations: Not on file    Attends Club or Organization Meetings: Not on file    Marital Status: Not on file   Intimate Partner Violence:     Fear of Current or Ex-Partner: Not on file    Emotionally Abused: Not on file    Physically Abused: Not on file    Sexually Abused: Not on file   Housing Stability:     Unable to Pay for Housing in the Last Year: Not on file    Number of [place] in the Last Year: Not on file    Unstable Housing in the Last Year: Not on file       Current Outpatient Medications   Medication Sig Dispense Refill    omeprazole (PRILOSEC) 40 MG delayed release capsule Take 1 capsule by mouth daily 90 capsule 3    VASCEPA 1 g CAPS capsule       rivaroxaban (XARELTO) 20 MG TABS tablet TAKE 1 TABLET BY MOUTH DAILY WITH SUPPER 30 tablet 3    metoprolol succinate (TOPROL XL) 100 MG extended release tablet Take 1 tablet by mouth daily 90 tablet 3    losartan (COZAAR) 100 MG tablet       atorvastatin (LIPITOR) 80 MG tablet TAKE 1 TABLET BY MOUTH DAILY 90 tablet 3    aspirin 81 MG chewable tablet Take 1 tablet by mouth daily 90 tablet 3    Multiple Vitamins-Minerals (MULTIVITAMIN PO) Take 1 tablet by mouth daily. No current facility-administered medications for this visit. Allergies:  hehas No Known Allergies. ROS:  CV:  Denies chest pain; palpitations; shortness of breath; swelling of feet, ankles; and loss of consciousness. CON: Denies fever and dizziness. ENT:  Denies hearing loss / ringing, ear infections hoarseness, and swallowing problems. RESP:  Denies chronic cough, spitting up blood, and asthma/wheezing. GI: Denies abdominal pain, change in bowel habits, nausea or vomiting, and blood in stools.   : Denies frequent urination, burning or painful urination, blood in the urine, and bladder incontinence. NEURO:  Denies headache, memory loss, sleep disturbance, and tremor or movement disorder. PHYSICAL EXAM:   There were no vitals taken for this visit. GENERAL: Mikie Morales is a 61 y.o. male who is alert and oriented and sitting comfortably in our office. SKIN:  Intact without rashes, lesions or ulcerations. No obvious deformity or swelling. NEURO: Musculoskeletal and axillary nerves intact to sensory and motor testing. EYES:  Extraocular muscles intact. MOUTH: Oral mucosa moist.  No perioral lesions. PULM:  Respirations unlabored and regular. VASC:  Capillary refill less than 3 seconds. Cervical spine ROM WNL  Spurlings: negative,     MSK:  Forward elevation 180 degrees, external rotation in neutral 80 degrees, abduction 180 degrees, internal rotation to T8. Supraspinatus 5/5   External rotators 5/5  Internal 5/5  Full Can negative   Empty Can negative   Neer's test negative   Jang-Percy test. negative. Pain with cross body adduction positive. Anterior Labral Stress test negative. Speed's test negative   [place]'s test negative. Pain over anterolateral acromion negative. Pain over AC joint positive. Pain over traps/rhomboids negative. PSYCH:  Patient has good fund of knowledge and displays understanding of exam.    RADIOLOGY: No results found. 3 views of the Right shoulder were ordered, independently visualized by me, and discussed with patient. Findings: Right shoulder radiographs continue to demonstrate significant degenerative changes about the acromioclavicular joint without acute osseous abnormalities no obvious fractures or dislocations are noted on plain film radiograph of the right shoulder    Impression: Severe degenerative changes of the right acromioclavicular joint    IMPRESSION:     1.  Osteoarthritis of right acromioclavicular joint        PLAN:   We discussed some of the etiologies and natural histories of     ICD-10-CM    1. Osteoarthritis of right acromioclavicular joint  M19.011      We discussed the various treatment alternatives including anti-inflammatory medications, physical therapy, injections, further imaging studies and as a last resort surgery. Point patient has fairly significant acromioclavicular joint osteoarthritis and I do think a cortisone injection is appropriate 1 was administered into his right acromioclavicular joint the manner as typed in the chart patient tolerated procedure well we will see him back as often as every 3 months which she voiced understanding agreement this plan    Return to clinic No follow-ups on file. Fatimah Celaya Please be aware portions of this note were completed using voice recognition software and unforeseen errors may have occurred    Electronically signed by Vivian Isabel DO, FAOASM on 4/29/22 at 9:02 AM EDT    After the risks, benefits, alternatives and procedure of a  right acromioclavicular joint injection were discussed. Consent was obtained and a time out was performed. Structures of the [facility] joint were visualized under ultrasound with image capture to be uploaded into the electronic medical record and notation of vascular structures to be avoided. Area was prepped in a sterile manner with Betadine. The skin was then cooled with cold spray and re-cleaned with alcohol prep. Then under ultrasound guidance I injected 1 mL of 40 mg of kenalog and 1 mL of 0.5% Marcaine into the [facility] Joint with image capture of the injection to be uploaded into the electronic medical record. Patient tolerated the procedure well.

## 2022-08-01 ENCOUNTER — OFFICE VISIT (OUTPATIENT)
Dept: ONCOLOGY | Age: 67
End: 2022-08-01
Payer: MEDICARE

## 2022-08-01 ENCOUNTER — TELEPHONE (OUTPATIENT)
Dept: ONCOLOGY | Age: 67
End: 2022-08-01

## 2022-08-01 ENCOUNTER — HOSPITAL ENCOUNTER (OUTPATIENT)
Age: 67
Discharge: HOME | End: 2022-08-01
Payer: MEDICARE

## 2022-08-01 VITALS
DIASTOLIC BLOOD PRESSURE: 99 MMHG | HEART RATE: 81 BPM | SYSTOLIC BLOOD PRESSURE: 147 MMHG | WEIGHT: 290.5 LBS | TEMPERATURE: 97.4 F | BODY MASS INDEX: 34.45 KG/M2

## 2022-08-01 DIAGNOSIS — C91.10 CHRONIC LYMPHOCYTIC LEUKEMIA (HCC): Primary | ICD-10-CM

## 2022-08-01 DIAGNOSIS — C91.10 CLL (CHRONIC LYMPHOCYTIC LEUKEMIA) (HCC): ICD-10-CM

## 2022-08-01 LAB
ABSOLUTE EOS #: 0.13 K/UL (ref 0–0.4)
ABSOLUTE LYMPH #: 8.45 K/UL (ref 1–4.8)
ABSOLUTE MONO #: 0.52 K/UL (ref 0.1–0.8)
BASOPHILS # BLD: 0 % (ref 0–2)
BASOPHILS ABSOLUTE: 0 K/UL (ref 0–0.2)
EOSINOPHILS RELATIVE PERCENT: 1 % (ref 1–4)
HCT VFR BLD CALC: 41.1 % (ref 41–53)
HEMOGLOBIN: 14.1 G/DL (ref 13.5–17.5)
LACTATE DEHYDROGENASE: 163 U/L (ref 135–225)
LYMPHOCYTES # BLD: 65 % (ref 24–44)
MCH RBC QN AUTO: 31.2 PG (ref 26–34)
MCHC RBC AUTO-ENTMCNC: 34.3 G/DL (ref 31–37)
MCV RBC AUTO: 91.1 FL (ref 80–100)
MONOCYTES # BLD: 4 % (ref 1–7)
MORPHOLOGY: ABNORMAL
PDW BLD-RTO: 13.7 % (ref 12.5–15.4)
PLATELET # BLD: 205 K/UL (ref 140–450)
PMV BLD AUTO: 7.6 FL (ref 6–12)
RBC # BLD: 4.51 M/UL (ref 4.5–5.9)
SEG NEUTROPHILS: 30 % (ref 36–66)
SEGMENTED NEUTROPHILS ABSOLUTE COUNT: 3.9 K/UL (ref 1.8–7.7)
WBC # BLD: 13 K/UL (ref 3.5–11)

## 2022-08-01 PROCEDURE — 36415 COLL VENOUS BLD VENIPUNCTURE: CPT

## 2022-08-01 PROCEDURE — 1036F TOBACCO NON-USER: CPT | Performed by: INTERNAL MEDICINE

## 2022-08-01 PROCEDURE — G8427 DOCREV CUR MEDS BY ELIG CLIN: HCPCS | Performed by: INTERNAL MEDICINE

## 2022-08-01 PROCEDURE — 99211 OFF/OP EST MAY X REQ PHY/QHP: CPT | Performed by: INTERNAL MEDICINE

## 2022-08-01 PROCEDURE — 3017F COLORECTAL CA SCREEN DOC REV: CPT | Performed by: INTERNAL MEDICINE

## 2022-08-01 PROCEDURE — 99214 OFFICE O/P EST MOD 30 MIN: CPT | Performed by: INTERNAL MEDICINE

## 2022-08-01 PROCEDURE — 85025 COMPLETE CBC W/AUTO DIFF WBC: CPT

## 2022-08-01 PROCEDURE — G8417 CALC BMI ABV UP PARAM F/U: HCPCS | Performed by: INTERNAL MEDICINE

## 2022-08-01 PROCEDURE — 83615 LACTATE (LD) (LDH) ENZYME: CPT

## 2022-08-01 NOTE — PROGRESS NOTES
_             An electronic signature was used to authenticate this note. Chief Complaint   Patient presents with    Follow-up     Review status of disease       DIAGNOSIS:       Stage 0 CLL   leukocytosis with lymphocytosis on the blood test from 2014 secondary to CLL  Multiple comorbidities as listed    CURRENT THERAPY:         No indications to start treatment for CLL  BRIEF CASE HISTORY:      Mr. Rudy Saldaña is a very pleasant 61 y.o. male with history of multiple co morbidities as listed. Patient is referred for evaluation of leukocytosis. Patient had coronary artery disease history of acute MI. History of chronic back problems. He had routine labs twice yearly. Most recent labs showed white blood cells of 18.8. Differential was not done. Hemoglobin and platelets were normal.  Patient has no symptoms related to this problem. No history of fever or night sweats. No weight loss or decreased appetite. Actually he is gaining weight. Patient denies feeling lumps or bumps or enlarged lymph nodes. No repeated infections. His main complaints is related to tiredness and fatigue. Patient denies smoking or heavy alcohol drinking. .     INTERIM HISTORY:   Patient seen for follow-up CLL. Doing well clinically. No evidence of fever or night sweats. No weight loss or decreased appetite. No enlarged lymph nodes. No repeated infections. No other complaints. PAST MEDICAL HISTORY: has a past medical history of Arthritis, Chronic back pain, Chronic pain, Colon polyps, Degenerative disk disease, Failure of spinal fusion, Fractures, Hepatitis B, Hyperlipidemia, Hypertension, Irregular heart beat, Neuropathy, and Spinal stenosis. PAST SURGICAL HISTORY: has a past surgical history that includes Tonsillectomy (1965); tendon manipulation (Left, 1966);  Nerve Block (6-5-13); back surgery (7/2012); back surgery (1/7/11); back surgery (8/3/09); back surgery (10/1/08); and Colonoscopy (8/1/2014). CURRENT MEDICATIONS:  has a current medication list which includes the following prescription(s): rivaroxaban, omeprazole, metoprolol succinate, losartan, atorvastatin, aspirin, multiple vitamin, and vascepa. ALLERGIES:  has No Known Allergies. FAMILY HISTORY: Negative for any hematological or oncological conditions. SOCIAL HISTORY:  reports that he has never smoked. He has never used smokeless tobacco. He reports current alcohol use of about 6.0 standard drinks per week. He reports that he does not use drugs. REVIEW OF SYSTEMS:     General: Positive for weakness and fatigue. No unanticipated weight loss or decreased appetite. No fever or chills. Eyes: No blurred vision, eye pain or double vision. Ears: No hearing problems or drainage. No tinnitus. Throat: No sore throat, problems with swallowing or dysphagia. Respiratory: No cough, sputum or hemoptysis. No shortness of breath. No pleuritic chest pain. Cardiovascular: No chest pain, orthopnea or PND. No lower extremity edema. No palpitation. Gastrointestinal: No problems with swallowing. No abdominal pain or bloating. No nausea or vomiting. No diarrhea or constipation. No GI bleeding. Genitourinary: No dysuria, hematuria, frequency or urgency. Musculoskeletal: No muscle aches or pains. No limitation of movement. No back pain. No gait disturbance, No joint complaints. Dermatologic: No skin rashes or pruritus. No skin lesions or discolorations. Psychiatric: No depression, anxiety, or stress or signs of schizophrenia. No change in mood or affect. Hematologic: No history of bleeding tendency. No bruises or ecchymosis. No history of clotting problems. Infectious disease: No fever, chills or frequent infections. Endocrine: No polydipsia or polyuria. No temperature intolerance. Neurologic: No headaches or dizziness.  No weakness or numbness of the extremities. No changes in balance, coordination,  memory, mentation, behavior. Allergic/Immunologic: No nasal congestion or hives. No repeated infections. PHYSICAL EXAMINATION:  Blood pressure (!) 147/99, pulse 81, temperature 97.4 °F (36.3 °C), temperature source Temporal, weight 290 lb 8 oz (131.8 kg).   General appearance - well appearing, not in pain or distress  Mental status - good mood, alert and oriented  Eyes - pupils equal and reactive, extraocular eye movements intact  Ears - bilateral TM's and external ear canals normal  Nose - normal and patent, no erythema, discharge or polyps  Mouth - mucous membranes moist, pharynx normal without lesions  Neck - supple, no significant adenopathy  Lymphatics - no palpable lymphadenopathy, no hepatosplenomegaly  Chest - clear to auscultation, no wheezes, rales or rhonchi, symmetric air entry  Heart - normal rate, regular rhythm, normal S1, S2, no murmurs, rubs, clicks or gallops  Abdomen - soft, nontender, nondistended, no masses or organomegaly  Neurological - alert, oriented, normal speech, no focal findings or movement disorder noted  Musculoskeletal - no joint tenderness, deformity or swelling  Extremities - peripheral pulses normal, no pedal edema, no clubbing or cyanosis  Skin - normal coloration and turgor, no rashes, no suspicious skin lesions noted     Review of Diagnostic data:   Lab Results   Component Value Date    WBC 13.0 (H) 08/01/2022    HGB 14.1 08/01/2022    HCT 41.1 08/01/2022    MCV 91.1 08/01/2022     08/01/2022       Chemistry        Component Value Date/Time     10/07/2021 0944    K 4.9 10/07/2021 0944    CL 99 10/07/2021 0944    CO2 20 10/07/2021 0944    BUN 16 10/07/2021 0944    CREATININE 1.05 10/07/2021 0944        Component Value Date/Time    CALCIUM 9.3 10/07/2021 0944    ALKPHOS 93 04/04/2022 1137    AST 16 04/04/2022 1137    ALT 14 04/04/2022 1137    BILITOT 0.34 10/07/2021 0944        Peripheral blood flow cytometry is positive for CLL    IMPRESSION:   Stage 0 CLL   leukocytosis with lymphocytosis on the blood test from 2014 secondary to CLL  Multiple comorbidities as listed    PLAN: I reviewed the labs as above and discussed with the patient. I explained to the patient the nature of this hematologic problem. I explained the significance of these abnormalities in layman language. Patient was seen because of significant leukocytosis with no signs of infections. Further evaluation was noted to have persistent lymphocytosis. Flow cytometry showed positive CLL. I explained to the patient the nature of CLL, staging, prognosis and treatment. At the present time patient does not have any B symptoms and he has no significant lymphadenopathy or splenomegaly. He is hemoglobin and platelets are normal.  He is having stage 0 CLL. I explained to the patient that standard of care is not to treat CLL unless we see significant flareup. He was educated about symptoms and signs of CLL flareup so he can watch for and he can call us for any abnormalities. Patient will have continued monitoring with labs every 3 to 6 months for consideration of initiating treatment if we see any significant changes. Patient's questions were answered to the best of his satisfaction and he verbalized full understanding and agreement. [address] [place] [address] [facility]                            This note is created with the assistance of a speech recognition program.  While intending to generate a document that actually reflects the content of the visit, the document can still have some errors including those of syntax and sound a like substitutions which may escape proof reading. It such instances, actual meaning can be extrapolated by contextual diversion.

## 2022-08-01 NOTE — TELEPHONE ENCOUNTER
AVS from 8/1/22     RV 3-4 months with CBC, LDH at RV      Rv scheduled for 11/7 @ 10:15 am with labs at md visit    Pt was given AVS and appointment schedule    Electronically signed by Ted Grady on 8/1/2022 at 1:38 PM

## 2022-10-17 ENCOUNTER — OFFICE VISIT (OUTPATIENT)
Dept: PRIMARY CARE CLINIC | Age: 67
End: 2022-10-17
Payer: MEDICARE

## 2022-10-17 VITALS
HEIGHT: 77 IN | DIASTOLIC BLOOD PRESSURE: 82 MMHG | BODY MASS INDEX: 34.59 KG/M2 | HEART RATE: 74 BPM | RESPIRATION RATE: 16 BRPM | SYSTOLIC BLOOD PRESSURE: 136 MMHG | WEIGHT: 293 LBS

## 2022-10-17 DIAGNOSIS — R73.09 ELEVATED GLUCOSE: ICD-10-CM

## 2022-10-17 DIAGNOSIS — M79.89 PAIN AND SWELLING OF LOWER LEG, LEFT: ICD-10-CM

## 2022-10-17 DIAGNOSIS — M79.672 LEFT FOOT PAIN: ICD-10-CM

## 2022-10-17 DIAGNOSIS — R09.89 OTHER SPECIFIED SYMPTOMS AND SIGNS INVOLVING THE CIRCULATORY AND RESPIRATORY SYSTEMS: ICD-10-CM

## 2022-10-17 DIAGNOSIS — R53.83 OTHER FATIGUE: ICD-10-CM

## 2022-10-17 DIAGNOSIS — Z00.00 ENCOUNTER FOR GENERAL ADULT MEDICAL EXAMINATION W/O ABNORMAL FINDINGS: Primary | ICD-10-CM

## 2022-10-17 DIAGNOSIS — M79.662 PAIN AND SWELLING OF LOWER LEG, LEFT: ICD-10-CM

## 2022-10-17 DIAGNOSIS — Z13.1 SCREENING FOR DIABETES MELLITUS: ICD-10-CM

## 2022-10-17 DIAGNOSIS — E78.00 PURE HYPERCHOLESTEROLEMIA: ICD-10-CM

## 2022-10-17 DIAGNOSIS — Z13.220 SCREENING FOR LIPID DISORDERS: ICD-10-CM

## 2022-10-17 PROCEDURE — G8484 FLU IMMUNIZE NO ADMIN: HCPCS | Performed by: NURSE PRACTITIONER

## 2022-10-17 PROCEDURE — 1123F ACP DISCUSS/DSCN MKR DOCD: CPT | Performed by: NURSE PRACTITIONER

## 2022-10-17 PROCEDURE — G0439 PPPS, SUBSEQ VISIT: HCPCS | Performed by: NURSE PRACTITIONER

## 2022-10-17 PROCEDURE — 3017F COLORECTAL CA SCREEN DOC REV: CPT | Performed by: NURSE PRACTITIONER

## 2022-10-17 RX ORDER — OMEPRAZOLE 40 MG/1
40 CAPSULE, DELAYED RELEASE ORAL DAILY
Qty: 90 CAPSULE | Refills: 3 | Status: SHIPPED | OUTPATIENT
Start: 2022-10-17

## 2022-10-17 RX ORDER — LOSARTAN POTASSIUM 100 MG/1
100 TABLET ORAL DAILY
Qty: 90 TABLET | Refills: 3 | Status: SHIPPED | OUTPATIENT
Start: 2022-10-17

## 2022-10-17 ASSESSMENT — PATIENT HEALTH QUESTIONNAIRE - PHQ9
SUM OF ALL RESPONSES TO PHQ QUESTIONS 1-9: 0
1. LITTLE INTEREST OR PLEASURE IN DOING THINGS: 0
SUM OF ALL RESPONSES TO PHQ QUESTIONS 1-9: 0
2. FEELING DOWN, DEPRESSED OR HOPELESS: 0
SUM OF ALL RESPONSES TO PHQ QUESTIONS 1-9: 0
SUM OF ALL RESPONSES TO PHQ QUESTIONS 1-9: 0
SUM OF ALL RESPONSES TO PHQ9 QUESTIONS 1 & 2: 0

## 2022-10-17 ASSESSMENT — LIFESTYLE VARIABLES
HOW MANY STANDARD DRINKS CONTAINING ALCOHOL DO YOU HAVE ON A TYPICAL DAY: 3 OR 4
HOW OFTEN DO YOU HAVE A DRINK CONTAINING ALCOHOL: 2-4 TIMES A MONTH

## 2022-10-17 NOTE — PROGRESS NOTES
Medicare Annual Wellness Visit    Pat Kwon is here for Medicare AWV    Assessment & Plan   Encounter for general adult medical examination w/o abnormal findings  -     [facility] Referral to ACP Clinical Specialist  Screening for diabetes mellitus  Screening for lipid disorders  Elevated glucose  -     Comprehensive Metabolic Panel; Future  -     Hemoglobin A1C; Future  Other fatigue  -     TSH with Reflex; Future  Pure hypercholesterolemia  -     Lipid Panel; Future  Left foot pain  -     US DUP LOWER EXTREMITY LEFT MARILYN; Future  Pain and swelling of lower leg, left  -     US DUP LOWER EXTREMITY LEFT ARTERIES; Future  Other specified symptoms and signs involving the circulatory and respiratory systems   -     US DUP LOWER EXTREMITY LEFT ARTERIES; Future    Recommendations for Preventive Services Due: see orders and patient instructions/AVS.  Recommended screening schedule for the next 5-10 years is provided to the patient in written form: see Patient Instructions/AVS.     Return in about 6 months (around 4/17/2023) for recheck. Subjective   The following acute and/or chronic problems were also addressed today:  N/t with discoloration to left foot    Patient's complete Health Risk Assessment and screening values have been reviewed and are found in Flowsheets. The following problems were reviewed today and where indicated follow up appointments were made and/or referrals ordered.     Positive Risk Factor Screenings with Interventions:             General Health and ACP:  General  In general, how would you say your health is?: Good  In the past 7 days, have you experienced any of the following: New or Increased Pain, New or Increased Fatigue, Loneliness, Social Isolation, Stress or Anger?: No  Do you get the social and emotional support that you need?: Yes  Do you have a Living Will?: (!) No    Advance Directives       Power of  Living Will ACP-Advance Directive ACP-Power of     Not on File Not on File Not on File Not on File        General Health Risk Interventions:  No Living Will: Patient referred to [place] Habits/Nutrition:  Physical Activity: Sufficiently Active    Days of Exercise per Week: 5 days    Minutes of Exercise per Session: 30 min     Have you lost any weight without trying in the past 3 months?: No  Body mass index: (!) 34.74  Have you seen the dentist within the past year?: Appointment is scheduled  Health Habits/Nutrition Interventions:  Dental exam overdue:  patient encouraged to make appointment with his/her dentist    Hearing/Vision:  Do you or your family notice any trouble with your hearing that hasn't been managed with hearing aids?: No  Do you have difficulty driving, watching TV, or doing any of your daily activities because of your eyesight?: No  Have you had an eye exam within the past year?: (!) No  No results found. Hearing/Vision Interventions:  Vision concerns:  patient encouraged to make appointment with his/her eye specialist    Safety:  Do you have working smoke detectors?: Yes  Do you have any tripping hazards - loose or unsecured carpets or rugs?: No  Do you have any tripping hazards - clutter in doorways, halls, or stairs?: No  Do you have either shower bars, grab bars, non-slip mats or non-slip surfaces in your shower or bathtub?: (!) No  Do all of your stairways have a railing or banister?: Yes  Do you always fasten your seatbelt when you are in a car?: Yes  Safety Interventions:  Home safety tips provided           Objective   Vitals:    10/17/22 0743   BP: 136/82   Pulse: 74   Resp: 16   Weight: 293 lb (132.9 kg)   Height: 6' 5\" (1.956 m)      Body mass index is 34.74 kg/m².       General Appearance: alert and oriented to person, place and time, well developed and well- nourished, in no acute distress  Skin: warm and dry, no rash or erythema  Head: normocephalic and atraumatic  Eyes: pupils equal, round, and reactive to light, extraocular eye movements intact, conjunctivae normal  ENT: tympanic membrane, external ear and ear canal normal bilaterally, nose without deformity, nasal mucosa and turbinates normal without polyps  Neck: supple and non-tender without mass, no thyromegaly or thyroid nodules, no cervical lymphadenopathy  Pulmonary/Chest: clear to auscultation bilaterally- no wheezes, rales or rhonchi, normal air movement, no respiratory distress  Cardiovascular: normal rate, regular rhythm, normal S1 and S2, no murmurs, rubs, clicks, or gallops, distal pulses intact, no carotid bruits  Abdomen: soft, non-tender, non-distended, normal bowel sounds, no masses or organomegaly  Extremities: no cyanosis, clubbing or edema  Musculoskeletal: normal range of motion, no joint swelling, deformity or tenderness  Neurologic: reflexes normal and symmetric, no cranial nerve deficit, gait, coordination and speech normal       No Known Allergies  Prior to Visit Medications    Medication Sig Taking? Authorizing Provider   rivaroxaban (XARELTO) 20 MG TABS tablet TAKE 1 TABLET BY MOUTH DAILY WITH SUPPER Yes ANCA Artis CNP   omeprazole (PRILOSEC) 40 MG delayed release capsule Take 1 capsule by mouth daily Yes ANCA Artis CNP   losartan (COZAAR) 100 MG tablet Take 1 tablet by mouth daily Yes ANCA Artis CNP   metoprolol succinate (TOPROL XL) 100 MG extended release tablet Take 1 tablet by mouth daily Yes ANCA Artis CNP   atorvastatin (LIPITOR) 80 MG tablet TAKE 1 TABLET BY MOUTH DAILY Yes ANCA Artis CNP   aspirin 81 MG chewable tablet Take 1 tablet by mouth daily  ANCA Artis CNP   Multiple Vitamins-Minerals (MULTIVITAMIN PO) Take 1 tablet by mouth daily.   Historical Provider, MD Sweet (Including outside providers/suppliers regularly involved in providing care):   Patient Care Team:  ANCA Artis CNP as PCP - General (Nurse Practitioner)  Timmy Mead Umberto Forte - JULIANA as PCP - [facility] Empaneled Provider  Nury Burger, RN as Registered Nurse     Reviewed and updated this visit:  Tobacco  Allergies  Meds  Problems  Med Hx  Surg Hx  Soc Hx  Fam Hx                 Presents for AWV  BP well controlled  Weight is stable    Following with cardiology, using meds as prescribed    C/o epigastric pain- has appt with Dr. Sharon Kee on Monday to review  Has appt with hematology on 10/21/22 with repeat labs.  Will add additional annual labs    C/o increase in numbness and tingling to left foot  C/o discoloration to toes of left feet that comes and goes  Will also injure left foot with bleeding and not know it  Willing to have venous/arterial scan completed    Denies any other problems/concerns  Follow up in 6 months for recheck or earlier if needed

## 2022-10-18 ENCOUNTER — CLINICAL DOCUMENTATION (OUTPATIENT)
Dept: SPIRITUAL SERVICES | Age: 67
End: 2022-10-18

## 2022-10-18 NOTE — ACP (ADVANCE CARE PLANNING)
Advance Care Planning   Ambulatory ACP Specialist Patient Outreach    Date:  10/18/2022  ACP Specialist:  MICHA Aguirre    Outreach call to patient in follow-up to ACP Specialist referral from: ANCA Land CNP    [x] PCP  [x] Provider   [] Ambulatory Care Management [] Other for Reason:    [x] Advance Directive Assistance  [] Code Status Discussion  [] Complete Portable DNR Order  [] Discuss Goals of Care  [] Complete POST/MOST  [] Early ACP Decision-Making  [x] Other: Living Will     Date Referral Received: 10/17/2022    Today's Outreach:  [x] First   [] Second  [] Third                               Third outreach made by []  phone  [] email []   [employer]     Intervention:  [] Spoke with Patient  [x] Left VM requesting return call      Outcome: This ACP Coordinator attempted to reach the patient offering an ACP conversation, the patient was not reached, and a voice message has been left at the listed phone number. Next Step:   [] ACP scheduled conversation  [x] Outreach again in one week               [] Email / Mail ACP Info Sheets  [] Email / Mail Advance Directive            [] Close Referral. Routing closure to referring provider/staff and to ACP Specialist . [] Closure Letter mailed to Patient with Invitation to Contact ACP Specialist if/when ready.     Thank you for this referral.

## 2022-10-21 RX ORDER — ATORVASTATIN CALCIUM 80 MG/1
80 TABLET, FILM COATED ORAL DAILY
Qty: 90 TABLET | Refills: 3 | Status: CANCELLED | OUTPATIENT
Start: 2022-10-21

## 2022-10-21 RX ORDER — ATORVASTATIN CALCIUM 80 MG/1
80 TABLET, FILM COATED ORAL DAILY
Qty: 90 TABLET | Refills: 3 | Status: SHIPPED | OUTPATIENT
Start: 2022-10-21

## 2022-11-01 ENCOUNTER — CLINICAL DOCUMENTATION (OUTPATIENT)
Dept: SPIRITUAL SERVICES | Age: 67
End: 2022-11-01

## 2022-11-01 NOTE — ACP (ADVANCE CARE PLANNING)
Advance Care Planning   Ambulatory ACP Specialist Patient Outreach    Date:  11/1/2022  ACP Specialist:  Jhon Ricketts    Outreach call to patient in follow-up to ACP Specialist referral from: ANCA Lowe CNP    [x] PCP  [] Provider   [] Ambulatory Care Management [] Other for Reason:    [x] Advance Directive Assistance  [] Code Status Discussion  [] Complete Portable DNR Order  [] Discuss Goals of Care  [] Complete POST/MOST  [] Early ACP Decision-Making  [] Other    Date Referral Received: 10/17/22    Today's Outreach:  [] First   [x] Second  [] Third                               Second outreach made by [x]  phone  [] email []   [employer]     Intervention:  [] Spoke with Patient  [x] Left VM requesting return call      Outcome: Left detailed VM for Patient to return call. Next Step:   [] ACP scheduled conversation  [x] Outreach again in two week               [] Email / Mail ACP Info Sheets  [] Email / Mail Advance Directive            [] Close Referral. Routing closure to referring provider/staff and to ACP Specialist . [] Closure Letter mailed to Patient with Invitation to Contact ACP Specialist if/when ready.     Thank you for this referral.

## 2022-11-02 ENCOUNTER — TELEPHONE (OUTPATIENT)
Dept: PRIMARY CARE CLINIC | Age: 67
End: 2022-11-02

## 2022-11-02 DIAGNOSIS — M79.662 PAIN AND SWELLING OF LOWER LEG, LEFT: Primary | ICD-10-CM

## 2022-11-02 DIAGNOSIS — R09.89 OTHER SPECIFIED SYMPTOMS AND SIGNS INVOLVING THE CIRCULATORY AND RESPIRATORY SYSTEMS: ICD-10-CM

## 2022-11-02 DIAGNOSIS — M79.89 PAIN AND SWELLING OF LOWER LEG, LEFT: Primary | ICD-10-CM

## 2022-11-02 NOTE — TELEPHONE ENCOUNTER
Contacted Jane back she said yes you would need to change the order to either reflect PBR or SONALI.     she wants to know if you also would like patient to have the [address] as well or just the artery one?

## 2022-11-02 NOTE — TELEPHONE ENCOUNTER
Ariella Shaffer is calling into the office from [facility], patient is scheduled tomorrow for his [address], they are asking if the [address] for the left jeremiah is for the pain and swelling and check for DVT, and then if the US of the left arteries needs to be for artery SONALI or just an US for the left artery.     Please advise

## 2022-11-03 ENCOUNTER — HOSPITAL ENCOUNTER (OUTPATIENT)
Dept: ULTRASOUND IMAGING | Age: 67
Discharge: HOME | End: 2022-11-05
Payer: MEDICARE

## 2022-11-03 ENCOUNTER — HOSPITAL ENCOUNTER (OUTPATIENT)
Dept: VASCULAR LAB | Age: 67
Discharge: HOME | End: 2022-11-03
Payer: MEDICARE

## 2022-11-03 DIAGNOSIS — M79.89 PAIN AND SWELLING OF LOWER LEG, LEFT: ICD-10-CM

## 2022-11-03 DIAGNOSIS — R09.89 OTHER SPECIFIED SYMPTOMS AND SIGNS INVOLVING THE CIRCULATORY AND RESPIRATORY SYSTEMS: ICD-10-CM

## 2022-11-03 DIAGNOSIS — M79.89 PAIN AND SWELLING OF TOE OF LEFT FOOT: ICD-10-CM

## 2022-11-03 DIAGNOSIS — M79.675 PAIN AND SWELLING OF TOE OF LEFT FOOT: ICD-10-CM

## 2022-11-03 DIAGNOSIS — M79.662 PAIN AND SWELLING OF LOWER LEG, LEFT: ICD-10-CM

## 2022-11-03 DIAGNOSIS — M79.672 LEFT FOOT PAIN: ICD-10-CM

## 2022-11-03 PROCEDURE — 93922 UPR/L XTREMITY ART 2 LEVELS: CPT

## 2022-11-03 PROCEDURE — 93971 EXTREMITY STUDY: CPT

## 2022-11-07 ENCOUNTER — HOSPITAL ENCOUNTER (OUTPATIENT)
Age: 67
Discharge: HOME | End: 2022-11-07
Payer: MEDICARE

## 2022-11-07 ENCOUNTER — OFFICE VISIT (OUTPATIENT)
Dept: ONCOLOGY | Age: 67
End: 2022-11-07
Payer: MEDICARE

## 2022-11-07 ENCOUNTER — TELEPHONE (OUTPATIENT)
Dept: ONCOLOGY | Age: 67
End: 2022-11-07

## 2022-11-07 VITALS
SYSTOLIC BLOOD PRESSURE: 146 MMHG | TEMPERATURE: 96.8 F | DIASTOLIC BLOOD PRESSURE: 85 MMHG | BODY MASS INDEX: 34.15 KG/M2 | HEART RATE: 57 BPM | WEIGHT: 288 LBS

## 2022-11-07 DIAGNOSIS — E78.00 PURE HYPERCHOLESTEROLEMIA: ICD-10-CM

## 2022-11-07 DIAGNOSIS — C91.10 CHRONIC LYMPHOCYTIC LEUKEMIA (HCC): Primary | ICD-10-CM

## 2022-11-07 DIAGNOSIS — R73.09 ELEVATED GLUCOSE: ICD-10-CM

## 2022-11-07 DIAGNOSIS — C91.10 CLL (CHRONIC LYMPHOCYTIC LEUKEMIA) (HCC): ICD-10-CM

## 2022-11-07 DIAGNOSIS — R53.83 OTHER FATIGUE: ICD-10-CM

## 2022-11-07 LAB
ABSOLUTE EOS #: 0 K/UL (ref 0–0.4)
ABSOLUTE LYMPH #: 11.48 K/UL (ref 1–4.8)
ABSOLUTE MONO #: 0.16 K/UL (ref 0.1–0.8)
ALBUMIN SERPL-MCNC: 4.7 G/DL (ref 3.5–5.2)
ALBUMIN/GLOBULIN RATIO: 2 (ref 1–2.5)
ALP BLD-CCNC: 88 U/L (ref 40–129)
ALT SERPL-CCNC: 13 U/L (ref 5–41)
ANION GAP SERPL CALCULATED.3IONS-SCNC: 7 MMOL/L (ref 9–17)
AST SERPL-CCNC: 18 U/L
BASOPHILS # BLD: 0 % (ref 0–2)
BASOPHILS ABSOLUTE: 0 K/UL (ref 0–0.2)
BILIRUB SERPL-MCNC: 0.6 MG/DL (ref 0.3–1.2)
BUN BLDV-MCNC: 15 MG/DL (ref 8–23)
CALCIUM SERPL-MCNC: 9.4 MG/DL (ref 8.6–10.4)
CHLORIDE BLD-SCNC: 96 MMOL/L (ref 98–107)
CO2: 26 MMOL/L (ref 20–31)
CREAT SERPL-MCNC: 0.84 MG/DL (ref 0.7–1.2)
EOSINOPHILS RELATIVE PERCENT: 0 % (ref 1–4)
GFR SERPL CREATININE-BSD FRML MDRD: >60 ML/MIN/1.73M2
GLUCOSE BLD-MCNC: 102 MG/DL (ref 70–99)
HCT VFR BLD CALC: 42.2 % (ref 41–53)
HEMOGLOBIN: 14.5 G/DL (ref 13.5–17.5)
LACTATE DEHYDROGENASE: 153 U/L (ref 135–225)
LYMPHOCYTES # BLD: 70 % (ref 24–44)
MCH RBC QN AUTO: 30.8 PG (ref 26–34)
MCHC RBC AUTO-ENTMCNC: 34.3 G/DL (ref 31–37)
MCV RBC AUTO: 89.9 FL (ref 80–100)
MONOCYTES # BLD: 1 % (ref 1–7)
MORPHOLOGY: ABNORMAL
PDW BLD-RTO: 13.2 % (ref 12.5–15.4)
PLATELET # BLD: 216 K/UL (ref 140–450)
PMV BLD AUTO: 7.8 FL (ref 6–12)
POTASSIUM SERPL-SCNC: 4.7 MMOL/L (ref 3.7–5.3)
RBC # BLD: 4.7 M/UL (ref 4.5–5.9)
SEG NEUTROPHILS: 29 % (ref 36–66)
SEGMENTED NEUTROPHILS ABSOLUTE COUNT: 4.76 K/UL (ref 1.8–7.7)
SODIUM BLD-SCNC: 129 MMOL/L (ref 135–144)
TOTAL PROTEIN: 7 G/DL (ref 6.4–8.3)
TSH SERPL DL<=0.05 MIU/L-ACNC: 0.88 UIU/ML (ref 0.3–5)
WBC # BLD: 16.4 K/UL (ref 3.5–11)

## 2022-11-07 PROCEDURE — 80053 COMPREHEN METABOLIC PANEL: CPT

## 2022-11-07 PROCEDURE — 36415 COLL VENOUS BLD VENIPUNCTURE: CPT

## 2022-11-07 PROCEDURE — 3017F COLORECTAL CA SCREEN DOC REV: CPT | Performed by: INTERNAL MEDICINE

## 2022-11-07 PROCEDURE — G8417 CALC BMI ABV UP PARAM F/U: HCPCS | Performed by: INTERNAL MEDICINE

## 2022-11-07 PROCEDURE — 99211 OFF/OP EST MAY X REQ PHY/QHP: CPT | Performed by: INTERNAL MEDICINE

## 2022-11-07 PROCEDURE — G8427 DOCREV CUR MEDS BY ELIG CLIN: HCPCS | Performed by: INTERNAL MEDICINE

## 2022-11-07 PROCEDURE — 84443 ASSAY THYROID STIM HORMONE: CPT

## 2022-11-07 PROCEDURE — 99214 OFFICE O/P EST MOD 30 MIN: CPT | Performed by: INTERNAL MEDICINE

## 2022-11-07 PROCEDURE — 83615 LACTATE (LD) (LDH) ENZYME: CPT

## 2022-11-07 PROCEDURE — 80061 LIPID PANEL: CPT

## 2022-11-07 PROCEDURE — 1036F TOBACCO NON-USER: CPT | Performed by: INTERNAL MEDICINE

## 2022-11-07 PROCEDURE — 85025 COMPLETE CBC W/AUTO DIFF WBC: CPT

## 2022-11-07 PROCEDURE — 3074F SYST BP LT 130 MM HG: CPT | Performed by: INTERNAL MEDICINE

## 2022-11-07 PROCEDURE — 83036 HEMOGLOBIN GLYCOSYLATED A1C: CPT

## 2022-11-07 PROCEDURE — 3078F DIAST BP <80 MM HG: CPT | Performed by: INTERNAL MEDICINE

## 2022-11-07 PROCEDURE — G8484 FLU IMMUNIZE NO ADMIN: HCPCS | Performed by: INTERNAL MEDICINE

## 2022-11-07 PROCEDURE — 1123F ACP DISCUSS/DSCN MKR DOCD: CPT | Performed by: INTERNAL MEDICINE

## 2022-11-07 NOTE — TELEPHONE ENCOUNTER
AVS from 11/7/22    RV 6 months with CBC, LDH at RV    Rv scheduled for 5/15/23 @ 10:15am  With labs at that time    PT was given AVS and appt schedule

## 2022-11-08 DIAGNOSIS — M79.89 PAIN AND SWELLING OF LOWER LEG, LEFT: ICD-10-CM

## 2022-11-08 DIAGNOSIS — I73.9 PAD (PERIPHERAL ARTERY DISEASE) (HCC): Primary | ICD-10-CM

## 2022-11-08 DIAGNOSIS — M79.662 PAIN AND SWELLING OF LOWER LEG, LEFT: ICD-10-CM

## 2022-11-08 LAB
CHOLESTEROL/HDL RATIO: 3.6
CHOLESTEROL: 155 MG/DL
ESTIMATED AVERAGE GLUCOSE: 126 MG/DL
HBA1C MFR BLD: 6 % (ref 4–6)
HDLC SERPL-MCNC: 43 MG/DL
LDL CHOLESTEROL: 52 MG/DL (ref 0–130)
TRIGL SERPL-MCNC: 298 MG/DL

## 2022-11-13 NOTE — PROGRESS NOTES
_             An electronic signature was used to authenticate this note. Chief Complaint   Patient presents with    Follow-up     Review status of disease       DIAGNOSIS:       Stage 0 CLL   leukocytosis with lymphocytosis on the blood test from 2014 secondary to CLL  Multiple comorbidities as listed    CURRENT THERAPY:         No indications to start treatment for CLL  BRIEF CASE HISTORY:      Mr. Pool Landin is a very pleasant 74 y.o. male with history of multiple co morbidities as listed. Patient is referred for evaluation of leukocytosis. Patient had coronary artery disease history of acute MI. History of chronic back problems. He had routine labs twice yearly. Most recent labs showed white blood cells of 18.8. Differential was not done. Hemoglobin and platelets were normal.  Patient has no symptoms related to this problem. No history of fever or night sweats. No weight loss or decreased appetite. Actually he is gaining weight. Patient denies feeling lumps or bumps or enlarged lymph nodes. No repeated infections. His main complaints is related to tiredness and fatigue. Patient denies smoking or heavy alcohol drinking. .     INTERIM HISTORY:   Patient seen for follow-up CLL. Doing well clinically. No evidence of fever or night sweats. No weight loss or decreased appetite. No enlarged lymph nodes. No repeated infections. No other complaints. PAST MEDICAL HISTORY: has a past medical history of Arthritis, Chronic back pain, Chronic pain, Colon polyps, Degenerative disk disease, Failure of spinal fusion, Fractures, Hepatitis B, Hyperlipidemia, Hypertension, Irregular heart beat, Neuropathy, and Spinal stenosis. PAST SURGICAL HISTORY: has a past surgical history that includes Tonsillectomy (1965); tendon manipulation (Left, 1966);  Nerve Block (6-5-13); back surgery (7/2012); back surgery (1/7/11); back surgery (8/3/09); back surgery (10/1/08); and Colonoscopy (8/1/2014). CURRENT MEDICATIONS:  has a current medication list which includes the following prescription(s): atorvastatin, rivaroxaban, omeprazole, losartan, metoprolol succinate, aspirin, and multiple vitamin. ALLERGIES:  has No Known Allergies. FAMILY HISTORY: Negative for any hematological or oncological conditions. SOCIAL HISTORY:  reports that he has never smoked. He has never used smokeless tobacco. He reports current alcohol use of about 6.0 standard drinks per week. He reports that he does not use drugs. REVIEW OF SYSTEMS:     General: Positive for weakness and fatigue. No unanticipated weight loss or decreased appetite. No fever or chills. Eyes: No blurred vision, eye pain or double vision. Ears: No hearing problems or drainage. No tinnitus. Throat: No sore throat, problems with swallowing or dysphagia. Respiratory: No cough, sputum or hemoptysis. No shortness of breath. No pleuritic chest pain. Cardiovascular: No chest pain, orthopnea or PND. No lower extremity edema. No palpitation. Gastrointestinal: No problems with swallowing. No abdominal pain or bloating. No nausea or vomiting. No diarrhea or constipation. No GI bleeding. Genitourinary: No dysuria, hematuria, frequency or urgency. Musculoskeletal: No muscle aches or pains. No limitation of movement. No back pain. No gait disturbance, No joint complaints. Dermatologic: No skin rashes or pruritus. No skin lesions or discolorations. Psychiatric: No depression, anxiety, or stress or signs of schizophrenia. No change in mood or affect. Hematologic: No history of bleeding tendency. No bruises or ecchymosis. No history of clotting problems. Infectious disease: No fever, chills or frequent infections. Endocrine: No polydipsia or polyuria. No temperature intolerance. Neurologic: No headaches or dizziness. No weakness or numbness of the extremities.  No changes in balance, coordination,  memory, mentation, behavior. Allergic/Immunologic: No nasal congestion or hives. No repeated infections. PHYSICAL EXAMINATION:  Blood pressure (!) 146/85, pulse 57, temperature 96.8 °F (36 °C), temperature source Temporal, weight 288 lb (130.6 kg).   General appearance - well appearing, not in pain or distress  Mental status - good mood, alert and oriented  Eyes - pupils equal and reactive, extraocular eye movements intact  Ears - bilateral TM's and external ear canals normal  Nose - normal and patent, no erythema, discharge or polyps  Mouth - mucous membranes moist, pharynx normal without lesions  Neck - supple, no significant adenopathy  Lymphatics - no palpable lymphadenopathy, no hepatosplenomegaly  Chest - clear to auscultation, no wheezes, rales or rhonchi, symmetric air entry  Heart - normal rate, regular rhythm, normal S1, S2, no murmurs, rubs, clicks or gallops  Abdomen - soft, nontender, nondistended, no masses or organomegaly  Neurological - alert, oriented, normal speech, no focal findings or movement disorder noted  Musculoskeletal - no joint tenderness, deformity or swelling  Extremities - peripheral pulses normal, no pedal edema, no clubbing or cyanosis  Skin - normal coloration and turgor, no rashes, no suspicious skin lesions noted     Review of Diagnostic data:   Lab Results   Component Value Date    WBC 16.4 (H) 11/07/2022    HGB 14.5 11/07/2022    HCT 42.2 11/07/2022    MCV 89.9 11/07/2022     11/07/2022       Chemistry        Component Value Date/Time     (L) 11/07/2022 0953    K 4.7 11/07/2022 0953    CL 96 (L) 11/07/2022 0953    CO2 26 11/07/2022 0953    BUN 15 11/07/2022 0953    CREATININE 0.84 11/07/2022 0953        Component Value Date/Time    CALCIUM 9.4 11/07/2022 0953    ALKPHOS 88 11/07/2022 0953    AST 18 11/07/2022 0953    ALT 13 11/07/2022 0953    BILITOT 0.6 11/07/2022 0953        Peripheral blood flow cytometry is positive for CLL    IMPRESSION:   Stage 0 CLL   leukocytosis with lymphocytosis on the blood test from 2014 secondary to CLL  Multiple comorbidities as listed    PLAN: I reviewed the labs as above and discussed with the patient. I explained to the patient the nature of this hematologic problem. I explained the significance of these abnormalities in layman language. Patient was seen because of significant leukocytosis with no signs of infections. Further evaluation was noted to have persistent lymphocytosis. Flow cytometry showed positive CLL. I explained to the patient the nature of CLL, staging, prognosis and treatment. At the present time patient does not have any B symptoms and he has no significant lymphadenopathy or splenomegaly. He is hemoglobin and platelets are normal.  He is having stage 0 CLL. I explained to the patient that standard of care is not to treat CLL unless we see significant flareup. He was educated about symptoms and signs of CLL flareup so he can watch for and he can call us for any abnormalities. Patient will have continued monitoring with labs every 6 months for consideration of initiating treatment if we see any significant changes. Patient's questions were answered to the best of his satisfaction and he verbalized full understanding and agreement. [address] [place] [address] [facility]                            This note is created with the assistance of a speech recognition program.  While intending to generate a document that actually reflects the content of the visit, the document can still have some errors including those of syntax and sound a like substitutions which may escape proof reading. It such instances, actual meaning can be extrapolated by contextual diversion.

## 2022-11-21 ENCOUNTER — CLINICAL DOCUMENTATION (OUTPATIENT)
Dept: SPIRITUAL SERVICES | Age: 67
End: 2022-11-21

## 2022-11-21 NOTE — ACP (ADVANCE CARE PLANNING)
Advance Care Planning   Ambulatory ACP Specialist Patient Outreach    Date:  11/21/2022  ACP Specialist:  Annie Moctezuma    Outreach call to patient in follow-up to ACP Specialist referral from: ANCA Wiley CNP    [x] PCP  [] Provider   [] Ambulatory Care Management [] Other for Reason:    [x] Advance Directive Assistance  [] Code Status Discussion  [] Complete Portable DNR Order  [] Discuss Goals of Care  [] Complete POST/MOST  [] Early ACP Decision-Making  [] Other    Date Referral Received:10/17/22    Today's Outreach:  [] First   [] Second  [x] Third                               Third outreach made by [x]  phone  [] email []   [employer] [facility] [place]     Intervention:  [] Spoke with Patient  [x] Left VM requesting return call      Outcome: Third Outreach. Left detailed VM for Patient to return call. Next Step:   [] ACP scheduled conversation  [] Outreach again in one week               [] Email / Mail ACP Info Sheets  [] Email / Mail Advance Directive            [x] Close Referral. Routing closure to referring provider/staff and to ACP Specialist . [] Closure Letter mailed to Patient with Invitation to Contact ACP Specialist if/when ready.     Thank you for this referral.

## 2023-02-02 RX ORDER — METOPROLOL SUCCINATE 100 MG/1
100 TABLET, EXTENDED RELEASE ORAL DAILY
Qty: 90 TABLET | Refills: 3 | Status: SHIPPED | OUTPATIENT
Start: 2023-02-02

## 2023-04-17 ENCOUNTER — OFFICE VISIT (OUTPATIENT)
Dept: PRIMARY CARE CLINIC | Age: 68
End: 2023-04-17
Payer: MEDICARE

## 2023-04-17 VITALS
HEART RATE: 98 BPM | WEIGHT: 279.2 LBS | TEMPERATURE: 98.2 F | OXYGEN SATURATION: 99 % | BODY MASS INDEX: 33.11 KG/M2 | RESPIRATION RATE: 24 BRPM | DIASTOLIC BLOOD PRESSURE: 84 MMHG | SYSTOLIC BLOOD PRESSURE: 138 MMHG

## 2023-04-17 DIAGNOSIS — J06.9 UPPER RESPIRATORY TRACT INFECTION, UNSPECIFIED TYPE: ICD-10-CM

## 2023-04-17 DIAGNOSIS — I10 ESSENTIAL HYPERTENSION: Primary | ICD-10-CM

## 2023-04-17 DIAGNOSIS — I48.91 ATRIAL FIBRILLATION, UNSPECIFIED TYPE (HCC): ICD-10-CM

## 2023-04-17 DIAGNOSIS — C91.10 CHRONIC LYMPHOCYTIC LEUKEMIA (HCC): ICD-10-CM

## 2023-04-17 DIAGNOSIS — E78.00 PURE HYPERCHOLESTEROLEMIA: ICD-10-CM

## 2023-04-17 PROCEDURE — 3075F SYST BP GE 130 - 139MM HG: CPT | Performed by: NURSE PRACTITIONER

## 2023-04-17 PROCEDURE — 1036F TOBACCO NON-USER: CPT | Performed by: NURSE PRACTITIONER

## 2023-04-17 PROCEDURE — 1123F ACP DISCUSS/DSCN MKR DOCD: CPT | Performed by: NURSE PRACTITIONER

## 2023-04-17 PROCEDURE — 3017F COLORECTAL CA SCREEN DOC REV: CPT | Performed by: NURSE PRACTITIONER

## 2023-04-17 PROCEDURE — 99214 OFFICE O/P EST MOD 30 MIN: CPT | Performed by: NURSE PRACTITIONER

## 2023-04-17 PROCEDURE — G8427 DOCREV CUR MEDS BY ELIG CLIN: HCPCS | Performed by: NURSE PRACTITIONER

## 2023-04-17 PROCEDURE — 3079F DIAST BP 80-89 MM HG: CPT | Performed by: NURSE PRACTITIONER

## 2023-04-17 PROCEDURE — G8417 CALC BMI ABV UP PARAM F/U: HCPCS | Performed by: NURSE PRACTITIONER

## 2023-04-17 RX ORDER — AMOXICILLIN AND CLAVULANATE POTASSIUM 875; 125 MG/1; MG/1
1 TABLET, FILM COATED ORAL 2 TIMES DAILY
Qty: 20 TABLET | Refills: 0 | Status: SHIPPED | OUTPATIENT
Start: 2023-04-17 | End: 2023-04-27

## 2023-04-17 RX ORDER — PANTOPRAZOLE SODIUM 40 MG/1
40 TABLET, DELAYED RELEASE ORAL DAILY
COMMUNITY

## 2023-04-17 SDOH — ECONOMIC STABILITY: FOOD INSECURITY: WITHIN THE PAST 12 MONTHS, YOU WORRIED THAT YOUR FOOD WOULD RUN OUT BEFORE YOU GOT MONEY TO BUY MORE.: NEVER TRUE

## 2023-04-17 SDOH — ECONOMIC STABILITY: HOUSING INSECURITY
IN THE LAST 12 MONTHS, WAS THERE A TIME WHEN YOU DID NOT HAVE A STEADY PLACE TO SLEEP OR SLEPT IN A SHELTER (INCLUDING NOW)?: NO

## 2023-04-17 SDOH — ECONOMIC STABILITY: INCOME INSECURITY: HOW HARD IS IT FOR YOU TO PAY FOR THE VERY BASICS LIKE FOOD, HOUSING, MEDICAL CARE, AND HEATING?: NOT HARD AT ALL

## 2023-04-17 SDOH — ECONOMIC STABILITY: FOOD INSECURITY: WITHIN THE PAST 12 MONTHS, THE FOOD YOU BOUGHT JUST DIDN'T LAST AND YOU DIDN'T HAVE MONEY TO GET MORE.: NEVER TRUE

## 2023-04-17 ASSESSMENT — ENCOUNTER SYMPTOMS
ABDOMINAL PAIN: 0
DIARRHEA: 0
SHORTNESS OF BREATH: 1
SINUS PRESSURE: 1
COUGH: 0
SORE THROAT: 0
CONSTIPATION: 0
SINUS PAIN: 1
BACK PAIN: 0
NAUSEA: 0

## 2023-04-17 ASSESSMENT — PATIENT HEALTH QUESTIONNAIRE - PHQ9
2. FEELING DOWN, DEPRESSED OR HOPELESS: 0
SUM OF ALL RESPONSES TO PHQ QUESTIONS 1-9: 0
1. LITTLE INTEREST OR PLEASURE IN DOING THINGS: 0
SUM OF ALL RESPONSES TO PHQ9 QUESTIONS 1 & 2: 0

## 2023-04-17 NOTE — PROGRESS NOTES
round, and reactive to light. Cardiovascular:      Rate and Rhythm: Normal rate and regular rhythm. Heart sounds: Normal heart sounds. Pulmonary:      Effort: Pulmonary effort is normal.      Breath sounds: Normal breath sounds. Abdominal:      General: Bowel sounds are normal.      Palpations: Abdomen is soft. Tenderness: There is no abdominal tenderness. Musculoskeletal:         General: Normal range of motion. Cervical back: Normal range of motion. Skin:     General: Skin is warm and dry. Neurological:      Mental Status: He is alert and oriented to person, place, and time. Psychiatric:         Behavior: Behavior normal.         Thought Content: Thought content normal.         Judgment: Judgment normal.     /84   Pulse 98   Temp 98.2 °F (36.8 °C) (Oral)   Resp 24   Wt 279 lb 3.2 oz (126.6 kg)   SpO2 99%   BMI 33.11 kg/m²     Assessment:       Diagnosis Orders   1. Essential hypertension        2. Chronic lymphocytic leukemia ([facility] [address]        3. Atrial fibrillation, unspecified type ([facility] [address]        4. Pure hypercholesterolemia        5. Upper respiratory tract infection, unspecified type                  Plan:      Return in about 6 months (around 10/17/2023), or if symptoms worsen or fail to improve, for AWV. Chronic conditions- Stable. Continue diet/exercise. Continue current meds. Follow up in six months for labs and AWV/earlier if needed  URI- Rx given for Augmentin. Rest/fluids. Follow up if no improvement with use of antibiotic. Encouraged ER for changes or worsening of symptoms, he states an understanding. Orders Placed This Encounter   Medications    amoxicillin-clavulanate (AUGMENTIN) 875-125 MG per tablet     Sig: Take 1 tablet by mouth 2 times daily for 10 days Barrier contraception is recommended. Take with a minimum of 8 ounces of water. Dispense:  20 tablet     Refill:  0       Patient given educational materials - see patient instructions. Discussed use,

## 2023-06-16 ENCOUNTER — HOSPITAL ENCOUNTER (OUTPATIENT)
Age: 68
Discharge: HOME | End: 2023-06-16
Payer: MEDICARE

## 2023-06-16 DIAGNOSIS — C91.10 CLL (CHRONIC LYMPHOCYTIC LEUKEMIA) (HCC): ICD-10-CM

## 2023-06-16 LAB
BASOPHILS # BLD: 0 K/UL (ref 0–0.2)
BASOPHILS NFR BLD: 0 % (ref 0–2)
EOSINOPHIL # BLD: 0.43 K/UL (ref 0–0.4)
EOSINOPHILS RELATIVE PERCENT: 2 % (ref 1–4)
ERYTHROCYTE [DISTWIDTH] IN BLOOD BY AUTOMATED COUNT: 13.2 % (ref 12.5–15.4)
HCT VFR BLD AUTO: 42.4 % (ref 41–53)
HGB BLD-MCNC: 14.2 G/DL (ref 13.5–17.5)
LDH SERPL-CCNC: 254 U/L (ref 135–225)
LYMPHOCYTES # BLD: 71 % (ref 24–44)
LYMPHOCYTES NFR BLD: 15.41 K/UL (ref 1–4.8)
MCH RBC QN AUTO: 30.4 PG (ref 26–34)
MCHC RBC AUTO-ENTMCNC: 33.4 G/DL (ref 31–37)
MCV RBC AUTO: 91 FL (ref 80–100)
MONOCYTES NFR BLD: 0.65 K/UL (ref 0.1–0.8)
MONOCYTES NFR BLD: 3 % (ref 1–7)
MORPHOLOGY: NORMAL
NEUTROPHILS NFR BLD: 24 % (ref 36–66)
NEUTS SEG NFR BLD: 5.21 K/UL (ref 1.8–7.7)
PLATELET # BLD AUTO: 217 K/UL (ref 140–450)
PMV BLD AUTO: 8 FL (ref 6–12)
RBC # BLD AUTO: 4.65 M/UL (ref 4.5–5.9)
WBC OTHER # BLD: 21.7 K/UL (ref 3.5–11)

## 2023-06-16 PROCEDURE — 85027 COMPLETE CBC AUTOMATED: CPT

## 2023-06-16 PROCEDURE — 36415 COLL VENOUS BLD VENIPUNCTURE: CPT

## 2023-06-16 PROCEDURE — 83615 LACTATE (LD) (LDH) ENZYME: CPT

## 2023-09-27 RX ORDER — LOSARTAN POTASSIUM 100 MG/1
100 TABLET ORAL DAILY
Qty: 90 TABLET | Refills: 3 | Status: SHIPPED | OUTPATIENT
Start: 2023-09-27

## 2023-09-27 RX ORDER — ATORVASTATIN CALCIUM 80 MG/1
80 TABLET, FILM COATED ORAL DAILY
Qty: 90 TABLET | Refills: 3 | Status: SHIPPED | OUTPATIENT
Start: 2023-09-27

## 2023-12-26 RX ORDER — OMEPRAZOLE 40 MG/1
40 CAPSULE, DELAYED RELEASE ORAL DAILY
Qty: 90 CAPSULE | Refills: 3 | Status: SHIPPED | OUTPATIENT
Start: 2023-12-26

## 2023-12-29 ENCOUNTER — HOSPITAL ENCOUNTER (OUTPATIENT)
Age: 68
Discharge: HOME | End: 2023-12-29
Payer: MEDICARE

## 2023-12-29 ENCOUNTER — OFFICE VISIT (OUTPATIENT)
Dept: ONCOLOGY | Age: 68
End: 2023-12-29
Payer: MEDICARE

## 2023-12-29 ENCOUNTER — HOSPITAL ENCOUNTER (OUTPATIENT)
Age: 68
End: 2023-12-29
Payer: MEDICARE

## 2023-12-29 ENCOUNTER — TELEPHONE (OUTPATIENT)
Dept: ONCOLOGY | Age: 68
End: 2023-12-29

## 2023-12-29 VITALS
OXYGEN SATURATION: 99 % | WEIGHT: 295 LBS | HEART RATE: 97 BPM | TEMPERATURE: 96.9 F | BODY MASS INDEX: 34.98 KG/M2 | SYSTOLIC BLOOD PRESSURE: 158 MMHG | DIASTOLIC BLOOD PRESSURE: 104 MMHG | RESPIRATION RATE: 18 BRPM

## 2023-12-29 DIAGNOSIS — C91.10 CHRONIC LYMPHOCYTIC LEUKEMIA (HCC): Primary | ICD-10-CM

## 2023-12-29 DIAGNOSIS — C91.10 CLL (CHRONIC LYMPHOCYTIC LEUKEMIA) (HCC): ICD-10-CM

## 2023-12-29 LAB
BASOPHILS # BLD: 0 K/UL (ref 0–0.2)
BASOPHILS NFR BLD: 0 % (ref 0–2)
CHOLEST SERPL-MCNC: 184 MG/DL (ref 0–199)
CHOLESTEROL/HDL RATIO: 4
EOSINOPHIL # BLD: 0 K/UL (ref 0–0.4)
EOSINOPHILS RELATIVE PERCENT: 0 % (ref 1–4)
ERYTHROCYTE [DISTWIDTH] IN BLOOD BY AUTOMATED COUNT: 13.4 % (ref 12.5–15.4)
HCT VFR BLD AUTO: 43.3 % (ref 41–53)
HDLC SERPL-MCNC: 43 MG/DL
HGB BLD-MCNC: 14.8 G/DL (ref 13.5–17.5)
LDH SERPL-CCNC: 190 U/L (ref 135–225)
LDLC SERPL CALC-MCNC: 73 MG/DL (ref 0–100)
LYMPHOCYTES NFR BLD: 26.06 K/UL (ref 1–4.8)
LYMPHOCYTES RELATIVE PERCENT: 83 % (ref 24–44)
MCH RBC QN AUTO: 30.8 PG (ref 26–34)
MCHC RBC AUTO-ENTMCNC: 34.2 G/DL (ref 31–37)
MCV RBC AUTO: 89.9 FL (ref 80–100)
MONOCYTES NFR BLD: 0.63 K/UL (ref 0.1–0.8)
MONOCYTES NFR BLD: 2 % (ref 1–7)
MORPHOLOGY: ABNORMAL
NEUTROPHILS NFR BLD: 15 % (ref 36–66)
NEUTS SEG NFR BLD: 4.71 K/UL (ref 1.8–7.7)
PLATELET # BLD AUTO: 209 K/UL (ref 140–450)
PMV BLD AUTO: 7.7 FL (ref 6–12)
RBC # BLD AUTO: 4.81 M/UL (ref 4.5–5.9)
TRIGL SERPL-MCNC: 338 MG/DL (ref 0–149)
VLDLC SERPL CALC-MCNC: 68 MG/DL
WBC OTHER # BLD: 31.4 K/UL (ref 3.5–11)

## 2023-12-29 PROCEDURE — G8427 DOCREV CUR MEDS BY ELIG CLIN: HCPCS | Performed by: INTERNAL MEDICINE

## 2023-12-29 PROCEDURE — 3077F SYST BP >= 140 MM HG: CPT | Performed by: INTERNAL MEDICINE

## 2023-12-29 PROCEDURE — 36415 COLL VENOUS BLD VENIPUNCTURE: CPT

## 2023-12-29 PROCEDURE — 3080F DIAST BP >= 90 MM HG: CPT | Performed by: INTERNAL MEDICINE

## 2023-12-29 PROCEDURE — 1123F ACP DISCUSS/DSCN MKR DOCD: CPT | Performed by: INTERNAL MEDICINE

## 2023-12-29 PROCEDURE — 99214 OFFICE O/P EST MOD 30 MIN: CPT | Performed by: INTERNAL MEDICINE

## 2023-12-29 PROCEDURE — 99211 OFF/OP EST MAY X REQ PHY/QHP: CPT | Performed by: INTERNAL MEDICINE

## 2023-12-29 PROCEDURE — G8484 FLU IMMUNIZE NO ADMIN: HCPCS | Performed by: INTERNAL MEDICINE

## 2023-12-29 PROCEDURE — G8417 CALC BMI ABV UP PARAM F/U: HCPCS | Performed by: INTERNAL MEDICINE

## 2023-12-29 PROCEDURE — 80061 LIPID PANEL: CPT

## 2023-12-29 PROCEDURE — 1036F TOBACCO NON-USER: CPT | Performed by: INTERNAL MEDICINE

## 2023-12-29 PROCEDURE — 85025 COMPLETE CBC W/AUTO DIFF WBC: CPT

## 2023-12-29 PROCEDURE — 3017F COLORECTAL CA SCREEN DOC REV: CPT | Performed by: INTERNAL MEDICINE

## 2023-12-29 PROCEDURE — 83615 LACTATE (LD) (LDH) ENZYME: CPT

## 2023-12-29 NOTE — TELEPHONE ENCOUNTER
RV 6 months with CBC, LDH at RV       Rv scheduled for 6/28/24 @ 10:45am        Pt will have labs( cbc, cmp, ldh) drawn one week prior to RV      PT was given AVS and appt schedule    Electronically signed by Shaina Luevano on 12/29/2023 at 11:36 AM

## 2023-12-29 NOTE — PROGRESS NOTES
back surgery (10/1/08); and Colonoscopy (8/1/2014). CURRENT MEDICATIONS:  has a current medication list which includes the following prescription(s): losartan, atorvastatin, rivaroxaban, pantoprazole, metoprolol succinate, aspirin, multiple vitamin, and omeprazole. ALLERGIES:  has No Known Allergies. FAMILY HISTORY: Negative for any hematological or oncological conditions. SOCIAL HISTORY:  reports that he has never smoked. He has never used smokeless tobacco. He reports current alcohol use of about 6.0 standard drinks of alcohol per week. He reports that he does not use drugs. REVIEW OF SYSTEMS:     General: Positive for weakness and fatigue. No unanticipated weight loss or decreased appetite. No fever or chills. Eyes: No blurred vision, eye pain or double vision. Ears: No hearing problems or drainage. No tinnitus. Throat: No sore throat, problems with swallowing or dysphagia. Respiratory: No cough, sputum or hemoptysis. No shortness of breath. No pleuritic chest pain. Cardiovascular: No chest pain, orthopnea or PND. No lower extremity edema. No palpitation. Gastrointestinal: No problems with swallowing. No abdominal pain or bloating. No nausea or vomiting. No diarrhea or constipation. No GI bleeding. Genitourinary: No dysuria, hematuria, frequency or urgency. Musculoskeletal: No muscle aches or pains. No limitation of movement. No back pain. No gait disturbance, No joint complaints. Dermatologic: No skin rashes or pruritus. No skin lesions or discolorations. Psychiatric: No depression, anxiety, or stress or signs of schizophrenia. No change in mood or affect. Hematologic: No history of bleeding tendency. No bruises or ecchymosis. No history of clotting problems. Infectious disease: No fever, chills or frequent infections. Endocrine: No polydipsia or polyuria. No temperature intolerance. Neurologic: No headaches or dizziness. No weakness or numbness of the extremities.

## 2024-02-19 ENCOUNTER — OFFICE VISIT (OUTPATIENT)
Dept: PRIMARY CARE CLINIC | Age: 69
End: 2024-02-19
Payer: MEDICARE

## 2024-02-19 VITALS
HEART RATE: 74 BPM | SYSTOLIC BLOOD PRESSURE: 138 MMHG | RESPIRATION RATE: 16 BRPM | BODY MASS INDEX: 34.86 KG/M2 | HEIGHT: 77 IN | OXYGEN SATURATION: 96 % | WEIGHT: 295.2 LBS | DIASTOLIC BLOOD PRESSURE: 86 MMHG

## 2024-02-19 DIAGNOSIS — G47.33 OSA (OBSTRUCTIVE SLEEP APNEA): ICD-10-CM

## 2024-02-19 DIAGNOSIS — C91.10 CHRONIC LYMPHOCYTIC LEUKEMIA (HCC): ICD-10-CM

## 2024-02-19 DIAGNOSIS — R73.09 ELEVATED GLUCOSE: ICD-10-CM

## 2024-02-19 DIAGNOSIS — R53.83 OTHER FATIGUE: ICD-10-CM

## 2024-02-19 DIAGNOSIS — I48.91 ATRIAL FIBRILLATION, UNSPECIFIED TYPE (HCC): ICD-10-CM

## 2024-02-19 DIAGNOSIS — Z23 NEED FOR PNEUMOCOCCAL VACCINATION: ICD-10-CM

## 2024-02-19 DIAGNOSIS — Z00.00 ENCOUNTER FOR GENERAL ADULT MEDICAL EXAMINATION W/O ABNORMAL FINDINGS: Primary | ICD-10-CM

## 2024-02-19 PROCEDURE — G8484 FLU IMMUNIZE NO ADMIN: HCPCS | Performed by: NURSE PRACTITIONER

## 2024-02-19 PROCEDURE — G0009 ADMIN PNEUMOCOCCAL VACCINE: HCPCS | Performed by: NURSE PRACTITIONER

## 2024-02-19 PROCEDURE — G0439 PPPS, SUBSEQ VISIT: HCPCS | Performed by: NURSE PRACTITIONER

## 2024-02-19 PROCEDURE — 1123F ACP DISCUSS/DSCN MKR DOCD: CPT | Performed by: NURSE PRACTITIONER

## 2024-02-19 PROCEDURE — 3075F SYST BP GE 130 - 139MM HG: CPT | Performed by: NURSE PRACTITIONER

## 2024-02-19 PROCEDURE — 3017F COLORECTAL CA SCREEN DOC REV: CPT | Performed by: NURSE PRACTITIONER

## 2024-02-19 PROCEDURE — 3079F DIAST BP 80-89 MM HG: CPT | Performed by: NURSE PRACTITIONER

## 2024-02-19 PROCEDURE — 90677 PCV20 VACCINE IM: CPT | Performed by: NURSE PRACTITIONER

## 2024-02-19 RX ORDER — METOPROLOL SUCCINATE 100 MG/1
100 TABLET, EXTENDED RELEASE ORAL DAILY
Qty: 90 TABLET | Refills: 3 | Status: SHIPPED | OUTPATIENT
Start: 2024-02-19

## 2024-02-19 SDOH — ECONOMIC STABILITY: INCOME INSECURITY: HOW HARD IS IT FOR YOU TO PAY FOR THE VERY BASICS LIKE FOOD, HOUSING, MEDICAL CARE, AND HEATING?: NOT HARD AT ALL

## 2024-02-19 SDOH — ECONOMIC STABILITY: FOOD INSECURITY: WITHIN THE PAST 12 MONTHS, THE FOOD YOU BOUGHT JUST DIDN'T LAST AND YOU DIDN'T HAVE MONEY TO GET MORE.: NEVER TRUE

## 2024-02-19 SDOH — ECONOMIC STABILITY: FOOD INSECURITY: WITHIN THE PAST 12 MONTHS, YOU WORRIED THAT YOUR FOOD WOULD RUN OUT BEFORE YOU GOT MONEY TO BUY MORE.: NEVER TRUE

## 2024-02-19 ASSESSMENT — PATIENT HEALTH QUESTIONNAIRE - PHQ9
SUM OF ALL RESPONSES TO PHQ QUESTIONS 1-9: 0
SUM OF ALL RESPONSES TO PHQ QUESTIONS 1-9: 0
2. FEELING DOWN, DEPRESSED OR HOPELESS: 0
SUM OF ALL RESPONSES TO PHQ9 QUESTIONS 1 & 2: 0
SUM OF ALL RESPONSES TO PHQ QUESTIONS 1-9: 0
1. LITTLE INTEREST OR PLEASURE IN DOING THINGS: 0
SUM OF ALL RESPONSES TO PHQ QUESTIONS 1-9: 0

## 2024-02-19 ASSESSMENT — LIFESTYLE VARIABLES
HOW OFTEN DO YOU HAVE A DRINK CONTAINING ALCOHOL: NEVER
HOW MANY STANDARD DRINKS CONTAINING ALCOHOL DO YOU HAVE ON A TYPICAL DAY: PATIENT DOES NOT DRINK

## 2024-02-19 NOTE — PROGRESS NOTES
Medicare Annual Wellness Visit    Tye Espinal is here for Medicare AWV and Insomnia    Assessment & Plan   Encounter for general adult medical examination w/o abnormal findings  Chronic lymphocytic leukemia (HCC)  Atrial fibrillation, unspecified type (HCC)  Elevated glucose  -     Comprehensive Metabolic Panel; Future  -     Hemoglobin A1C; Future  Other fatigue  -     CBC; Future  -     TSH with Reflex; Future    Recommendations for Preventive Services Due: see orders and patient instructions/AVS.  Recommended screening schedule for the next 5-10 years is provided to the patient in written form: see Patient Instructions/AVS.     Return in about 6 months (around 8/19/2024) for recheck.     Subjective   The following acute and/or chronic problems were also addressed today:  See note    Patient's complete Health Risk Assessment and screening values have been reviewed and are found in Flowsheets. The following problems were reviewed today and where indicated follow up appointments were made and/or referrals ordered.    Positive Risk Factor Screenings with Interventions:                Activity, Diet, and Weight:  On average, how many days per week do you engage in moderate to strenuous exercise (like a brisk walk)?: 0 days  On average, how many minutes do you engage in exercise at this level?: 0 min    Do you eat balanced/healthy meals regularly?: Yes    Body mass index is 35.01 kg/m². (!) Abnormal        Inactivity Interventions:  Patient declined any further interventions or treatment  Obesity Interventions:  Patient declines any further evaluation or treatment                 Advanced Directives:  Do you have a Living Will?: (!) No    Intervention:  has NO advanced directive - not interested in additional information                     Objective   Vitals:    02/19/24 1306   BP: 138/86   Pulse: 74   Resp: 16   SpO2: 96%   Weight: 133.9 kg (295 lb 3.2 oz)   Height: 1.956 m (6' 5\")      Body mass index is 35.01

## 2024-06-26 DIAGNOSIS — C91.10 CHRONIC LYMPHOCYTIC LEUKEMIA (HCC): Primary | ICD-10-CM

## 2024-06-27 DIAGNOSIS — C91.10 CHRONIC LYMPHOCYTIC LEUKEMIA (HCC): Primary | ICD-10-CM

## 2024-06-28 ENCOUNTER — HOSPITAL ENCOUNTER (OUTPATIENT)
Age: 69
Discharge: HOME | End: 2024-06-28
Payer: MEDICARE

## 2024-06-28 ENCOUNTER — TELEPHONE (OUTPATIENT)
Dept: ONCOLOGY | Age: 69
End: 2024-06-28

## 2024-06-28 ENCOUNTER — OFFICE VISIT (OUTPATIENT)
Dept: ONCOLOGY | Age: 69
End: 2024-06-28
Payer: MEDICARE

## 2024-06-28 VITALS
OXYGEN SATURATION: 98 % | DIASTOLIC BLOOD PRESSURE: 96 MMHG | TEMPERATURE: 97 F | SYSTOLIC BLOOD PRESSURE: 149 MMHG | RESPIRATION RATE: 18 BRPM | BODY MASS INDEX: 34.96 KG/M2 | WEIGHT: 294.8 LBS | HEART RATE: 64 BPM

## 2024-06-28 DIAGNOSIS — R53.83 OTHER FATIGUE: ICD-10-CM

## 2024-06-28 DIAGNOSIS — C91.10 CHRONIC LYMPHOCYTIC LEUKEMIA (HCC): ICD-10-CM

## 2024-06-28 DIAGNOSIS — R73.09 ELEVATED GLUCOSE: ICD-10-CM

## 2024-06-28 DIAGNOSIS — C91.10 CHRONIC LYMPHOCYTIC LEUKEMIA (CLL) GENETIC MUTATION VARIANT (HCC): Primary | ICD-10-CM

## 2024-06-28 LAB
ALBUMIN SERPL-MCNC: 4.5 G/DL (ref 3.5–5.2)
ALBUMIN SERPL-MCNC: 4.5 G/DL (ref 3.5–5.2)
ALBUMIN/GLOB SERPL: 2 {RATIO} (ref 1–2.5)
ALBUMIN/GLOB SERPL: 2 {RATIO} (ref 1–2.5)
ALP SERPL-CCNC: 98 U/L (ref 40–129)
ALP SERPL-CCNC: 98 U/L (ref 40–129)
ALT SERPL-CCNC: 16 U/L (ref 5–41)
ALT SERPL-CCNC: 16 U/L (ref 5–41)
ANION GAP SERPL CALCULATED.3IONS-SCNC: 10 MMOL/L (ref 9–17)
ANION GAP SERPL CALCULATED.3IONS-SCNC: 10 MMOL/L (ref 9–17)
AST SERPL-CCNC: 18 U/L
AST SERPL-CCNC: 18 U/L
BASOPHILS # BLD: 0 K/UL (ref 0–0.2)
BASOPHILS NFR BLD: 0 % (ref 0–2)
BILIRUB SERPL-MCNC: 0.4 MG/DL (ref 0.3–1.2)
BILIRUB SERPL-MCNC: 0.4 MG/DL (ref 0.3–1.2)
BUN SERPL-MCNC: 17 MG/DL (ref 8–23)
BUN SERPL-MCNC: 17 MG/DL (ref 8–23)
CALCIUM SERPL-MCNC: 9.1 MG/DL (ref 8.6–10.4)
CALCIUM SERPL-MCNC: 9.1 MG/DL (ref 8.6–10.4)
CHLORIDE SERPL-SCNC: 99 MMOL/L (ref 98–107)
CHLORIDE SERPL-SCNC: 99 MMOL/L (ref 98–107)
CO2 SERPL-SCNC: 26 MMOL/L (ref 20–31)
CO2 SERPL-SCNC: 26 MMOL/L (ref 20–31)
CREAT SERPL-MCNC: 1 MG/DL (ref 0.7–1.2)
CREAT SERPL-MCNC: 1 MG/DL (ref 0.7–1.2)
EOSINOPHIL # BLD: 0.29 K/UL (ref 0–0.4)
EOSINOPHILS RELATIVE PERCENT: 1 % (ref 1–4)
ERYTHROCYTE [DISTWIDTH] IN BLOOD BY AUTOMATED COUNT: 13.7 % (ref 12.5–15.4)
ERYTHROCYTE [DISTWIDTH] IN BLOOD BY AUTOMATED COUNT: 13.7 % (ref 12.5–15.4)
EST. AVERAGE GLUCOSE BLD GHB EST-MCNC: 131 MG/DL
GFR, ESTIMATED: 83 ML/MIN/1.73M2
GFR, ESTIMATED: 83 ML/MIN/1.73M2
GLUCOSE SERPL-MCNC: 112 MG/DL (ref 70–99)
GLUCOSE SERPL-MCNC: 112 MG/DL (ref 70–99)
HBA1C MFR BLD: 6.2 % (ref 4–6)
HCT VFR BLD AUTO: 41.1 % (ref 41–53)
HCT VFR BLD AUTO: 41.1 % (ref 41–53)
HGB BLD-MCNC: 13.5 G/DL (ref 13.5–17.5)
HGB BLD-MCNC: 13.5 G/DL (ref 13.5–17.5)
LDH SERPL-CCNC: 188 U/L (ref 135–225)
LYMPHOCYTES NFR BLD: 23.82 K/UL (ref 1–4.8)
LYMPHOCYTES RELATIVE PERCENT: 83 % (ref 24–44)
MCH RBC QN AUTO: 29.1 PG (ref 26–34)
MCH RBC QN AUTO: 29.1 PG (ref 26–34)
MCHC RBC AUTO-ENTMCNC: 32.8 G/DL (ref 31–37)
MCHC RBC AUTO-ENTMCNC: 32.8 G/DL (ref 31–37)
MCV RBC AUTO: 88.8 FL (ref 80–100)
MCV RBC AUTO: 88.8 FL (ref 80–100)
MONOCYTES NFR BLD: 0.57 K/UL (ref 0.1–0.8)
MONOCYTES NFR BLD: 2 % (ref 1–7)
MORPHOLOGY: ABNORMAL
NEUTROPHILS NFR BLD: 14 % (ref 36–66)
NEUTS SEG NFR BLD: 4.02 K/UL (ref 1.8–7.7)
PLATELET # BLD AUTO: 205 K/UL (ref 140–450)
PLATELET # BLD AUTO: 205 K/UL (ref 140–450)
PMV BLD AUTO: 8.1 FL (ref 6–12)
PMV BLD AUTO: 8.1 FL (ref 6–12)
POTASSIUM SERPL-SCNC: 4.8 MMOL/L (ref 3.7–5.3)
POTASSIUM SERPL-SCNC: 4.8 MMOL/L (ref 3.7–5.3)
PROT SERPL-MCNC: 6.8 G/DL (ref 6.4–8.3)
PROT SERPL-MCNC: 6.8 G/DL (ref 6.4–8.3)
RBC # BLD AUTO: 4.63 M/UL (ref 4.5–5.9)
RBC # BLD AUTO: 4.63 M/UL (ref 4.5–5.9)
SODIUM SERPL-SCNC: 135 MMOL/L (ref 135–144)
SODIUM SERPL-SCNC: 135 MMOL/L (ref 135–144)
TSH SERPL DL<=0.05 MIU/L-ACNC: 0.59 UIU/ML (ref 0.3–5)
WBC OTHER # BLD: 28.7 K/UL (ref 3.5–11)
WBC OTHER # BLD: 28.7 K/UL (ref 3.5–11)

## 2024-06-28 PROCEDURE — 3017F COLORECTAL CA SCREEN DOC REV: CPT | Performed by: INTERNAL MEDICINE

## 2024-06-28 PROCEDURE — 83036 HEMOGLOBIN GLYCOSYLATED A1C: CPT

## 2024-06-28 PROCEDURE — G8417 CALC BMI ABV UP PARAM F/U: HCPCS | Performed by: INTERNAL MEDICINE

## 2024-06-28 PROCEDURE — 3077F SYST BP >= 140 MM HG: CPT | Performed by: INTERNAL MEDICINE

## 2024-06-28 PROCEDURE — G8427 DOCREV CUR MEDS BY ELIG CLIN: HCPCS | Performed by: INTERNAL MEDICINE

## 2024-06-28 PROCEDURE — 1036F TOBACCO NON-USER: CPT | Performed by: INTERNAL MEDICINE

## 2024-06-28 PROCEDURE — 99211 OFF/OP EST MAY X REQ PHY/QHP: CPT | Performed by: INTERNAL MEDICINE

## 2024-06-28 PROCEDURE — 36415 COLL VENOUS BLD VENIPUNCTURE: CPT

## 2024-06-28 PROCEDURE — 3080F DIAST BP >= 90 MM HG: CPT | Performed by: INTERNAL MEDICINE

## 2024-06-28 PROCEDURE — 99214 OFFICE O/P EST MOD 30 MIN: CPT | Performed by: INTERNAL MEDICINE

## 2024-06-28 PROCEDURE — 85025 COMPLETE CBC W/AUTO DIFF WBC: CPT

## 2024-06-28 PROCEDURE — 1123F ACP DISCUSS/DSCN MKR DOCD: CPT | Performed by: INTERNAL MEDICINE

## 2024-06-28 PROCEDURE — 84443 ASSAY THYROID STIM HORMONE: CPT

## 2024-06-28 PROCEDURE — 80053 COMPREHEN METABOLIC PANEL: CPT

## 2024-06-28 PROCEDURE — 83615 LACTATE (LD) (LDH) ENZYME: CPT

## 2024-06-28 RX ORDER — GABAPENTIN 100 MG/1
CAPSULE ORAL
COMMUNITY
Start: 2024-06-26

## 2024-06-28 NOTE — TELEPHONE ENCOUNTER
Instructions   from Dr. Josse Palacios MD    RV 6 months with CBC, LDH at RV    RV with labs scheduled for 1/3/25

## 2024-07-03 NOTE — PROGRESS NOTES
_             An electronic signature was used to authenticate this note.  Chief Complaint   Patient presents with    Follow-up     6 month       DIAGNOSIS:       Stage 0 CLL   leukocytosis with lymphocytosis on the blood test from 2014 secondary to CLL  Multiple comorbidities as listed    CURRENT THERAPY:         No indications to start treatment for CLL  BRIEF CASE HISTORY:      Mr. Tye Espinal is a very pleasant 68 y.o. male with history of multiple co morbidities as listed.  Patient is referred for evaluation of leukocytosis.  Patient had coronary artery disease history of acute MI.  History of chronic back problems.  He had routine labs twice yearly.  Most recent labs showed white blood cells of 18.8.  Differential was not done.  Hemoglobin and platelets were normal.  Patient has no symptoms related to this problem.  No history of fever or night sweats.  No weight loss or decreased appetite.  Actually he is gaining weight.  Patient denies feeling lumps or bumps or enlarged lymph nodes.  No repeated infections.  His main complaints is related to tiredness and fatigue.  Patient denies smoking or heavy alcohol drinking..     INTERIM HISTORY:   Patient seen for follow-up CLL.  Doing well clinically.  No evidence of fever or night sweats.  No weight loss or decreased appetite.  No enlarged lymph nodes.  No repeated infections.  No other complaints.       PAST MEDICAL HISTORY: has a past medical history of Arthritis, Chronic back pain, Chronic pain, Colon polyps, Degenerative disk disease, Failure of spinal fusion, Fractures, Hepatitis B, Hyperlipidemia, Hypertension, Irregular heart beat, Neuropathy, and Spinal stenosis.    PAST SURGICAL HISTORY: has a past surgical history that includes Tonsillectomy (1965); tendon manipulation (Left, 1966); Nerve Block (6-5-13); back surgery (7/2012); back surgery (1/7/11); back surgery (8/3/09);

## 2024-08-19 ENCOUNTER — OFFICE VISIT (OUTPATIENT)
Dept: PRIMARY CARE CLINIC | Age: 69
End: 2024-08-19
Payer: MEDICARE

## 2024-08-19 VITALS
HEIGHT: 77 IN | DIASTOLIC BLOOD PRESSURE: 80 MMHG | WEIGHT: 289.8 LBS | HEART RATE: 81 BPM | OXYGEN SATURATION: 98 % | SYSTOLIC BLOOD PRESSURE: 130 MMHG | BODY MASS INDEX: 34.22 KG/M2

## 2024-08-19 DIAGNOSIS — D68.69 SECONDARY HYPERCOAGULABLE STATE (HCC): ICD-10-CM

## 2024-08-19 DIAGNOSIS — I48.91 ATRIAL FIBRILLATION, UNSPECIFIED TYPE (HCC): Primary | ICD-10-CM

## 2024-08-19 DIAGNOSIS — G47.33 OSA (OBSTRUCTIVE SLEEP APNEA): ICD-10-CM

## 2024-08-19 DIAGNOSIS — R53.83 OTHER FATIGUE: ICD-10-CM

## 2024-08-19 DIAGNOSIS — C91.10 CHRONIC LYMPHOCYTIC LEUKEMIA (CLL) GENETIC MUTATION VARIANT (HCC): ICD-10-CM

## 2024-08-19 DIAGNOSIS — I10 ESSENTIAL HYPERTENSION: ICD-10-CM

## 2024-08-19 DIAGNOSIS — C91.10 CHRONIC LYMPHOCYTIC LEUKEMIA (HCC): ICD-10-CM

## 2024-08-19 DIAGNOSIS — E78.00 PURE HYPERCHOLESTEROLEMIA: ICD-10-CM

## 2024-08-19 PROCEDURE — G8427 DOCREV CUR MEDS BY ELIG CLIN: HCPCS | Performed by: NURSE PRACTITIONER

## 2024-08-19 PROCEDURE — 3079F DIAST BP 80-89 MM HG: CPT | Performed by: NURSE PRACTITIONER

## 2024-08-19 PROCEDURE — 1036F TOBACCO NON-USER: CPT | Performed by: NURSE PRACTITIONER

## 2024-08-19 PROCEDURE — 1123F ACP DISCUSS/DSCN MKR DOCD: CPT | Performed by: NURSE PRACTITIONER

## 2024-08-19 PROCEDURE — G2211 COMPLEX E/M VISIT ADD ON: HCPCS | Performed by: NURSE PRACTITIONER

## 2024-08-19 PROCEDURE — 3075F SYST BP GE 130 - 139MM HG: CPT | Performed by: NURSE PRACTITIONER

## 2024-08-19 PROCEDURE — 3017F COLORECTAL CA SCREEN DOC REV: CPT | Performed by: NURSE PRACTITIONER

## 2024-08-19 PROCEDURE — 99214 OFFICE O/P EST MOD 30 MIN: CPT | Performed by: NURSE PRACTITIONER

## 2024-08-19 PROCEDURE — G8417 CALC BMI ABV UP PARAM F/U: HCPCS | Performed by: NURSE PRACTITIONER

## 2024-08-19 RX ORDER — GABAPENTIN 300 MG/1
300 CAPSULE ORAL NIGHTLY
Qty: 90 CAPSULE | Refills: 0 | Status: SHIPPED | OUTPATIENT
Start: 2024-08-19 | End: 2024-11-17

## 2024-08-19 ASSESSMENT — ENCOUNTER SYMPTOMS
BACK PAIN: 0
ABDOMINAL PAIN: 0
COUGH: 0
SHORTNESS OF BREATH: 1

## 2024-08-19 ASSESSMENT — PATIENT HEALTH QUESTIONNAIRE - PHQ9
SUM OF ALL RESPONSES TO PHQ QUESTIONS 1-9: 0
2. FEELING DOWN, DEPRESSED OR HOPELESS: NOT AT ALL
1. LITTLE INTEREST OR PLEASURE IN DOING THINGS: NOT AT ALL
SUM OF ALL RESPONSES TO PHQ9 QUESTIONS 1 & 2: 0
SUM OF ALL RESPONSES TO PHQ QUESTIONS 1-9: 0

## 2024-08-19 NOTE — PROGRESS NOTES
[facility]  [facility]  [address] [place] [address] [address]  [address]  [facility] [address]  Dept: [phone]  Dept Fax: [phone]    Tye Espinal is a 68 y.o. male who presentstoday for his medical conditions/complaints as noted below.  Tye Espinal is c/o of  Chief Complaint   Patient presents with    Hypertension     6 month f/u - patient has not been checking blood pressure at home.    Numbness     Patient complains of LT foot numbness. Patient states he does not have feeling in his toes.            HPI:     Presents for 6 month recheck on chronic conditions  BP elevated, improves with rest  Has lost 6lb since LOV    Main concern today is fatigue/not sleeping well  Does not have CPAP machine  Will resend order to [facility]  Hx of afib  Reviewed importance of wearing CPAP nightly    C/o epigastric pain  Improves with use of PPI  Follows with GI, due for colonoscopy in December    C/o SOB with exertion    Annual labs up to date    Denies any other problems/concerns        Hemoglobin A1C (%)   Date Value   06/28/2024 6.2 (H)   11/07/2022 6.0   10/07/2021 6.0             ( goal A1C is < 7)   No components found for: \"LABMICR\"  No components found for: \"LDLCHOLESTEROL\", \"LDLCALC\"    (goal LDL is <100)   AST (U/L)   Date Value   06/28/2024 18   06/28/2024 18     ALT (U/L)   Date Value   06/28/2024 16   06/28/2024 16     BUN (mg/dL)   Date Value   06/28/2024 17   06/28/2024 17     BP Readings from Last 3 Encounters:   08/19/24 (!) 148/86   06/28/24 (!) 149/96   02/19/24 138/86          ([id number])    Past Medical History:   Diagnosis Date    Arthritis     Chronic back pain     Chronic pain     Colon polyps     Degenerative disk disease     Failure of spinal fusion     Fractures     Hx multiple fractures:  bilat ankles, Ribs x3, Nose, Collar bone, fingers, toes thumb    Hepatitis B     Hyperlipidemia     ON RX    Hypertension 2001    ON RX    Irregular heart beat 2012    Neuropathy     BILAT

## 2024-10-04 RX ORDER — LOSARTAN POTASSIUM 100 MG/1
100 TABLET ORAL DAILY
Qty: 90 TABLET | Refills: 3 | Status: SHIPPED | OUTPATIENT
Start: 2024-10-04

## 2024-12-02 RX ORDER — ATORVASTATIN CALCIUM 80 MG/1
80 TABLET, FILM COATED ORAL DAILY
Qty: 90 TABLET | Refills: 3 | Status: SHIPPED | OUTPATIENT
Start: 2024-12-02

## 2025-01-03 ENCOUNTER — HOSPITAL ENCOUNTER (OUTPATIENT)
Age: 70
Discharge: HOME | End: 2025-01-03
Payer: MEDICARE

## 2025-01-03 ENCOUNTER — OFFICE VISIT (OUTPATIENT)
Dept: ONCOLOGY | Age: 70
End: 2025-01-03
Payer: MEDICARE

## 2025-01-03 ENCOUNTER — TELEPHONE (OUTPATIENT)
Dept: ONCOLOGY | Age: 70
End: 2025-01-03

## 2025-01-03 VITALS
SYSTOLIC BLOOD PRESSURE: 130 MMHG | DIASTOLIC BLOOD PRESSURE: 86 MMHG | TEMPERATURE: 97.7 F | RESPIRATION RATE: 20 BRPM | BODY MASS INDEX: 35.69 KG/M2 | WEIGHT: 301 LBS | HEART RATE: 75 BPM

## 2025-01-03 DIAGNOSIS — C91.10 CHRONIC LYMPHOCYTIC LEUKEMIA (CLL) GENETIC MUTATION VARIANT (HCC): Primary | ICD-10-CM

## 2025-01-03 DIAGNOSIS — C91.10 CHRONIC LYMPHOCYTIC LEUKEMIA (HCC): Primary | ICD-10-CM

## 2025-01-03 DIAGNOSIS — C91.10 CHRONIC LYMPHOCYTIC LEUKEMIA (CLL) GENETIC MUTATION VARIANT (HCC): ICD-10-CM

## 2025-01-03 LAB
ALBUMIN SERPL-MCNC: 4.4 G/DL (ref 3.5–5.2)
ALBUMIN/GLOB SERPL: 1.6 {RATIO} (ref 1–2.5)
ALP SERPL-CCNC: 113 U/L (ref 40–129)
ALT SERPL-CCNC: 13 U/L (ref 5–41)
ANION GAP SERPL CALCULATED.3IONS-SCNC: 11 MMOL/L (ref 9–17)
AST SERPL-CCNC: 22 U/L
BASOPHILS # BLD: 0 K/UL (ref 0–0.2)
BASOPHILS NFR BLD: 0 % (ref 0–2)
BILIRUB SERPL-MCNC: 0.7 MG/DL (ref 0.3–1.2)
BUN SERPL-MCNC: 15 MG/DL (ref 8–23)
CALCIUM SERPL-MCNC: 9 MG/DL (ref 8.6–10.4)
CHLORIDE SERPL-SCNC: 98 MMOL/L (ref 98–107)
CHOLEST SERPL-MCNC: 168 MG/DL (ref 0–199)
CHOLESTEROL/HDL RATIO: 3.4
CO2 SERPL-SCNC: 24 MMOL/L (ref 20–31)
CREAT SERPL-MCNC: 0.9 MG/DL (ref 0.7–1.2)
EOSINOPHIL # BLD: 0 K/UL (ref 0–0.4)
EOSINOPHILS RELATIVE PERCENT: 0 % (ref 1–4)
ERYTHROCYTE [DISTWIDTH] IN BLOOD BY AUTOMATED COUNT: 14.8 % (ref 12.5–15.4)
GFR, ESTIMATED: >90 ML/MIN/1.73M2
GLUCOSE SERPL-MCNC: 100 MG/DL (ref 70–99)
HCT VFR BLD AUTO: 41.3 % (ref 41–53)
HDLC SERPL-MCNC: 49 MG/DL
HGB BLD-MCNC: 13.8 G/DL (ref 13.5–17.5)
LDH SERPL-CCNC: 215 U/L (ref 135–225)
LDLC SERPL CALC-MCNC: 79 MG/DL (ref 0–100)
LYMPHOCYTES NFR BLD: 28.36 K/UL (ref 1–4.8)
LYMPHOCYTES RELATIVE PERCENT: 87 % (ref 24–44)
MCH RBC QN AUTO: 29.1 PG (ref 26–34)
MCHC RBC AUTO-ENTMCNC: 33.3 G/DL (ref 31–37)
MCV RBC AUTO: 87.3 FL (ref 80–100)
MONOCYTES NFR BLD: 0.98 K/UL (ref 0.1–0.8)
MONOCYTES NFR BLD: 3 % (ref 1–7)
MORPHOLOGY: ABNORMAL
NEUTROPHILS NFR BLD: 10 % (ref 36–66)
NEUTS SEG NFR BLD: 3.26 K/UL (ref 1.8–7.7)
PLATELET # BLD AUTO: 243 K/UL (ref 140–450)
PMV BLD AUTO: 7.5 FL (ref 6–12)
POTASSIUM SERPL-SCNC: 4.9 MMOL/L (ref 3.7–5.3)
PROT SERPL-MCNC: 7.1 G/DL (ref 6.4–8.3)
RBC # BLD AUTO: 4.73 M/UL (ref 4.5–5.9)
SODIUM SERPL-SCNC: 133 MMOL/L (ref 135–144)
TRIGL SERPL-MCNC: 198 MG/DL
VLDLC SERPL CALC-MCNC: 40 MG/DL (ref 1–30)
WBC OTHER # BLD: 32.6 K/UL (ref 3.5–11)

## 2025-01-03 PROCEDURE — 36415 COLL VENOUS BLD VENIPUNCTURE: CPT

## 2025-01-03 PROCEDURE — 80061 LIPID PANEL: CPT

## 2025-01-03 PROCEDURE — 99211 OFF/OP EST MAY X REQ PHY/QHP: CPT | Performed by: INTERNAL MEDICINE

## 2025-01-03 PROCEDURE — 83615 LACTATE (LD) (LDH) ENZYME: CPT

## 2025-01-03 PROCEDURE — 85025 COMPLETE CBC W/AUTO DIFF WBC: CPT

## 2025-01-03 PROCEDURE — 80053 COMPREHEN METABOLIC PANEL: CPT

## 2025-01-03 NOTE — TELEPHONE ENCOUNTER
Instructions   from Dr. Josse Palacios MD    RV 6 months with CBC, LDH at     RV scheduled 7/11/25 at 9:45am  [facility]

## 2025-01-03 NOTE — PROGRESS NOTES
_             An electronic signature was used to authenticate this note.  Chief Complaint   Patient presents with    Follow-up     6 month follow up       DIAGNOSIS:       Stage 0 CLL   leukocytosis with lymphocytosis on the blood test from 2014 secondary to CLL  Multiple comorbidities as listed    CURRENT THERAPY:         No indications to start treatment for CLL  BRIEF CASE HISTORY:      Mr. Tye Espinal is a very pleasant 69 y.o. male with history of multiple co morbidities as listed.  Patient is referred for evaluation of leukocytosis.  Patient had coronary artery disease history of acute MI.  History of chronic back problems.  He had routine labs twice yearly.  Most recent labs showed white blood cells of 18.8.  Differential was not done.  Hemoglobin and platelets were normal.  Patient has no symptoms related to this problem.  No history of fever or night sweats.  No weight loss or decreased appetite.  Actually he is gaining weight.  Patient denies feeling lumps or bumps or enlarged lymph nodes.  No repeated infections.  His main complaints is related to tiredness and fatigue.  Patient denies smoking or heavy alcohol drinking..     INTERIM HISTORY:   Patient seen for follow-up CLL.  Doing well clinically.  No evidence of fever or night sweats.  No weight loss or decreased appetite.  No enlarged lymph nodes.  No repeated infections.  No other complaints.       PAST MEDICAL HISTORY: has a past medical history of Arthritis, Chronic back pain, Chronic pain, Colon polyps, Degenerative disk disease, Failure of spinal fusion, Fractures, Hepatitis B, Hyperlipidemia, Hypertension, Irregular heart beat, Neuropathy, and Spinal stenosis.    PAST SURGICAL HISTORY: has a past surgical history that includes Tonsillectomy (1965); tendon manipulation (Left, 1966); Nerve Block (6-5-13); back surgery (7/2012); back surgery (1/7/11); back surgery

## 2025-03-20 ENCOUNTER — OFFICE VISIT (OUTPATIENT)
Dept: PRIMARY CARE CLINIC | Age: 70
End: 2025-03-20

## 2025-03-20 VITALS
BODY MASS INDEX: 35.14 KG/M2 | WEIGHT: 297.6 LBS | OXYGEN SATURATION: 95 % | HEIGHT: 77 IN | SYSTOLIC BLOOD PRESSURE: 132 MMHG | HEART RATE: 90 BPM | DIASTOLIC BLOOD PRESSURE: 84 MMHG

## 2025-03-20 DIAGNOSIS — I48.91 ATRIAL FIBRILLATION, UNSPECIFIED TYPE (HCC): ICD-10-CM

## 2025-03-20 DIAGNOSIS — Z00.00 ENCOUNTER FOR GENERAL ADULT MEDICAL EXAMINATION W/O ABNORMAL FINDINGS: Primary | ICD-10-CM

## 2025-03-20 RX ORDER — EZETIMIBE 10 MG/1
10 TABLET ORAL DAILY
COMMUNITY
Start: 2025-01-06

## 2025-03-20 SDOH — ECONOMIC STABILITY: FOOD INSECURITY: WITHIN THE PAST 12 MONTHS, YOU WORRIED THAT YOUR FOOD WOULD RUN OUT BEFORE YOU GOT MONEY TO BUY MORE.: NEVER TRUE

## 2025-03-20 SDOH — ECONOMIC STABILITY: FOOD INSECURITY: WITHIN THE PAST 12 MONTHS, THE FOOD YOU BOUGHT JUST DIDN'T LAST AND YOU DIDN'T HAVE MONEY TO GET MORE.: NEVER TRUE

## 2025-03-20 ASSESSMENT — PATIENT HEALTH QUESTIONNAIRE - PHQ9
SUM OF ALL RESPONSES TO PHQ QUESTIONS 1-9: 0
SUM OF ALL RESPONSES TO PHQ QUESTIONS 1-9: 0
2. FEELING DOWN, DEPRESSED OR HOPELESS: NOT AT ALL
SUM OF ALL RESPONSES TO PHQ QUESTIONS 1-9: 0
1. LITTLE INTEREST OR PLEASURE IN DOING THINGS: NOT AT ALL
SUM OF ALL RESPONSES TO PHQ QUESTIONS 1-9: 0

## 2025-03-20 ASSESSMENT — LIFESTYLE VARIABLES
HOW MANY STANDARD DRINKS CONTAINING ALCOHOL DO YOU HAVE ON A TYPICAL DAY: 1 OR 2
HOW OFTEN DO YOU HAVE A DRINK CONTAINING ALCOHOL: MONTHLY OR LESS

## 2025-03-20 NOTE — PROGRESS NOTES
Medicare Annual Wellness Visit    Tye Espinal is here for Annual Exam (awv)    Assessment & Plan   Encounter for general adult medical examination w/o abnormal findings  Atrial fibrillation, unspecified type (HCC)       Return in about 6 months (around 9/20/2025) for recheck.     Subjective   The following acute and/or chronic problems were also addressed today:  See below    Patient's complete Health Risk Assessment and screening values have been reviewed and are found in Flowsheets. The following problems were reviewed today and where indicated follow up appointments were made and/or referrals ordered.    Positive Risk Factor Screenings with Interventions:        Drug Use:   DAST-10 Score: 1  Interpretation: 1-2 indicates low level use. Recommendation: monitor and re-assess at a later date    Interventions:  Patient declined any further intervention or treatment           Abnormal BMI (obese):  There is no height or weight on file to calculate BMI. (!) Abnormal    Interventions:  Patient declines any further evaluation or treatment          Vision Screen:  Do you have difficulty driving, watching TV, or doing any of your daily activities because of your eyesight?: No  Have you had an eye exam within the past year?: (!) No    Interventions:   Patient declines any further evaluation or treatment      Advanced Directives:  Do you have a Living Will?: (!) No    Intervention:  has NO advanced directive - not interested in additional information                     Objective   There were no vitals filed for this visit.   There is no height or weight on file to calculate BMI.        General Appearance: alert and oriented to person, place and time, well developed and well- nourished, in no acute distress  Skin: warm and dry, no rash or erythema  Head: normocephalic and atraumatic  Eyes: pupils equal, round, and reactive to light, extraocular eye movements intact, conjunctivae normal  ENT: tympanic membrane, external ear

## 2025-04-14 ENCOUNTER — TELEPHONE (OUTPATIENT)
Dept: PRIMARY CARE CLINIC | Age: 70
End: 2025-04-14

## 2025-04-14 NOTE — TELEPHONE ENCOUNTER
Pt called into office stating he needs a referral to Vascular because his left leg and foot are swollen, and his left toe is red.     Please advise  Thanks

## 2025-04-16 ENCOUNTER — OFFICE VISIT (OUTPATIENT)
Dept: PRIMARY CARE CLINIC | Age: 70
End: 2025-04-16

## 2025-04-16 VITALS
RESPIRATION RATE: 16 BRPM | DIASTOLIC BLOOD PRESSURE: 88 MMHG | SYSTOLIC BLOOD PRESSURE: 136 MMHG | BODY MASS INDEX: 34.6 KG/M2 | WEIGHT: 291.8 LBS | HEART RATE: 84 BPM | OXYGEN SATURATION: 99 %

## 2025-04-16 DIAGNOSIS — G47.33 OSA (OBSTRUCTIVE SLEEP APNEA): ICD-10-CM

## 2025-04-16 DIAGNOSIS — M79.605 PAIN AND SWELLING OF LEFT LOWER EXTREMITY: ICD-10-CM

## 2025-04-16 DIAGNOSIS — C91.10 CHRONIC LYMPHOCYTIC LEUKEMIA (HCC): ICD-10-CM

## 2025-04-16 DIAGNOSIS — I48.91 ATRIAL FIBRILLATION, UNSPECIFIED TYPE (HCC): ICD-10-CM

## 2025-04-16 DIAGNOSIS — M79.89 PAIN AND SWELLING OF LEFT LOWER EXTREMITY: ICD-10-CM

## 2025-04-16 DIAGNOSIS — I10 ESSENTIAL HYPERTENSION: Primary | ICD-10-CM

## 2025-04-16 SDOH — ECONOMIC STABILITY: FOOD INSECURITY: WITHIN THE PAST 12 MONTHS, THE FOOD YOU BOUGHT JUST DIDN'T LAST AND YOU DIDN'T HAVE MONEY TO GET MORE.: NEVER TRUE

## 2025-04-16 SDOH — ECONOMIC STABILITY: FOOD INSECURITY: WITHIN THE PAST 12 MONTHS, YOU WORRIED THAT YOUR FOOD WOULD RUN OUT BEFORE YOU GOT MONEY TO BUY MORE.: NEVER TRUE

## 2025-04-16 ASSESSMENT — ENCOUNTER SYMPTOMS
ABDOMINAL PAIN: 0
BACK PAIN: 0
COUGH: 0
SHORTNESS OF BREATH: 0

## 2025-04-16 ASSESSMENT — PATIENT HEALTH QUESTIONNAIRE - PHQ9
SUM OF ALL RESPONSES TO PHQ QUESTIONS 1-9: 0
1. LITTLE INTEREST OR PLEASURE IN DOING THINGS: NOT AT ALL
SUM OF ALL RESPONSES TO PHQ QUESTIONS 1-9: 0
2. FEELING DOWN, DEPRESSED OR HOPELESS: NOT AT ALL

## 2025-04-16 NOTE — PROGRESS NOTES
[facility]  [facility]  [address] [place] [address] DR  [address]  [facility] [address]  Dept: [phone]  Dept Fax: [phone]    Tye Espinal is a 69 y.o. male who presentstoday for his medical conditions/complaints as noted below.  Tye Espinal is c/o of  Chief Complaint   Patient presents with    Edema     -discuss vascular referral  -left leg swelling             HPI:     Presents for recheck on chronic conditions  BP elevated, improves with rest  States he is compliant with his medications  Denies any headaches/dizziness/vision changes  Has lost 6lb since LOV  Working on diet/exercise    C/o increase in left lower extremity pain/swelling and left foot n/t  Had vascular studies completed 11/2022  SONALI:  Indications for Study:Cold digit / extremity, Cold sensitivity/discoloration  of extremities or digits and Numbness and tingling feet.   Patient Status:Out Patient.  Technical Quality:Poor visualization.  Limitation reason:non-compressible arteries .   Conclusions   Summary   Very limited exam.    Unable to obtain SONALI/TBI bilaterally.    US vascular LLE:  IMPRESSION:   Limited evaluation of the peroneal veins and otherwise normal left lower  extremity duplex venous ultrasound.     Was referred to vascular but he did not schedule  He met with podiatry who also suggested vascular. Given trial of gabapentin without improvement in symptoms  Willing to meet with vascular at this time    CLL- labs stable from January. Has recheck in July with oncology    Denies any other problems/concerns      Hemoglobin A1C (%)   Date Value   06/28/2024 6.2 (H)   11/07/2022 6.0   10/07/2021 6.0             ( goal A1C is < 7)   No components found for: \"LABMICR\"  No components found for: \"LDLCHOLESTEROL\", \"LDLCALC\"    (goal LDL is <100)   AST (U/L)   Date Value   01/03/2025 22     ALT (U/L)   Date Value   01/03/2025 13     BUN (mg/dL)   Date Value   01/03/2025 15     BP Readings from Last 3 Encounters:

## 2025-05-08 RX ORDER — GABAPENTIN 300 MG/1
300 CAPSULE ORAL NIGHTLY
Qty: 90 CAPSULE | Refills: 0 | Status: SHIPPED | OUTPATIENT
Start: 2025-05-08 | End: 2025-08-06

## 2025-05-13 ENCOUNTER — TELEPHONE (OUTPATIENT)
Dept: PRIMARY CARE CLINIC | Age: 70
End: 2025-05-13

## 2025-05-13 NOTE — TELEPHONE ENCOUNTER
Please obtain Dr. Yi's note to review/obtain dx for referral  Also include any testing they completed please

## 2025-05-13 NOTE — TELEPHONE ENCOUNTER
Patient called in today stating that he needs a vascular referral to Dr. Reyes in [place]. States Dr. Yi from the vein office states this is what he needs.

## 2025-05-14 NOTE — TELEPHONE ENCOUNTER
Patient called in checking on referral. Writer let him know that we're waiting for Dr Yi's note to be faxed over

## 2025-05-14 NOTE — TELEPHONE ENCOUNTER
Patient states they did not complete any testing, they stated they were a vein specialist and he needed to go see Vascular instead. Will obtain Dr. Agustin note.

## 2025-05-29 ENCOUNTER — TELEPHONE (OUTPATIENT)
Dept: PRIMARY CARE CLINIC | Age: 70
End: 2025-05-29

## 2025-05-29 DIAGNOSIS — M79.605 PAIN AND SWELLING OF LEFT LOWER EXTREMITY: Primary | ICD-10-CM

## 2025-05-29 DIAGNOSIS — M79.89 PAIN AND SWELLING OF LEFT LOWER EXTREMITY: Primary | ICD-10-CM

## 2025-05-29 NOTE — TELEPHONE ENCOUNTER
Patient called requesting new referral to be placed for Dr. Reyes vascular surgeon in [place].

## 2025-06-02 ENCOUNTER — INITIAL CONSULT (OUTPATIENT)
Dept: VASCULAR SURGERY | Age: 70
End: 2025-06-02
Payer: MEDICARE

## 2025-06-02 VITALS
DIASTOLIC BLOOD PRESSURE: 87 MMHG | OXYGEN SATURATION: 98 % | BODY MASS INDEX: 34.36 KG/M2 | HEIGHT: 77 IN | RESPIRATION RATE: 18 BRPM | HEART RATE: 98 BPM | SYSTOLIC BLOOD PRESSURE: 135 MMHG | WEIGHT: 291 LBS

## 2025-06-02 DIAGNOSIS — I73.00 RAYNAUD'S PHENOMENON WITHOUT GANGRENE: ICD-10-CM

## 2025-06-02 DIAGNOSIS — M79.89 PAIN AND SWELLING OF LEFT LOWER LEG: Primary | ICD-10-CM

## 2025-06-02 DIAGNOSIS — M79.662 PAIN AND SWELLING OF LEFT LOWER LEG: Primary | ICD-10-CM

## 2025-06-02 PROBLEM — M79.671 PAIN IN RIGHT FOOT: Status: RESOLVED | Noted: 2022-04-07 | Resolved: 2025-06-02

## 2025-06-02 PROCEDURE — 3017F COLORECTAL CA SCREEN DOC REV: CPT | Performed by: SURGERY

## 2025-06-02 PROCEDURE — 99203 OFFICE O/P NEW LOW 30 MIN: CPT | Performed by: SURGERY

## 2025-06-02 PROCEDURE — 1126F AMNT PAIN NOTED NONE PRSNT: CPT | Performed by: SURGERY

## 2025-06-02 PROCEDURE — G8417 CALC BMI ABV UP PARAM F/U: HCPCS | Performed by: SURGERY

## 2025-06-02 PROCEDURE — 3075F SYST BP GE 130 - 139MM HG: CPT | Performed by: SURGERY

## 2025-06-02 PROCEDURE — 1036F TOBACCO NON-USER: CPT | Performed by: SURGERY

## 2025-06-02 PROCEDURE — 1159F MED LIST DOCD IN RCRD: CPT | Performed by: SURGERY

## 2025-06-02 PROCEDURE — G8427 DOCREV CUR MEDS BY ELIG CLIN: HCPCS | Performed by: SURGERY

## 2025-06-02 PROCEDURE — 3079F DIAST BP 80-89 MM HG: CPT | Performed by: SURGERY

## 2025-06-02 PROCEDURE — 1123F ACP DISCUSS/DSCN MKR DOCD: CPT | Performed by: SURGERY

## 2025-06-02 RX ORDER — AMLODIPINE BESYLATE 5 MG/1
5 TABLET ORAL DAILY
Qty: 90 TABLET | Refills: 1 | Status: SHIPPED | OUTPATIENT
Start: 2025-06-02

## 2025-06-02 NOTE — PROGRESS NOTES
CO2 24 01/03/2025 09:29 AM    BUN 15 01/03/2025 09:29 AM    CREATININE 0.9 01/03/2025 09:29 AM    GLUCOSE 100 01/03/2025 09:29 AM    CALCIUM 9.0 01/03/2025 09:29 AM     Lab Results   Component Value Date/Time    ALKPHOS 113 01/03/2025 09:29 AM    ALT 13 01/03/2025 09:29 AM    AST 22 01/03/2025 09:29 AM    BILITOT 0.7 01/03/2025 09:29 AM     No results found for: \"LACTA\"  No results found for: \"AMYLASE\"  No results found for: \"LIPASE\"  Lab Results   Component Value Date/Time    INR 0.9 12/23/2013 10:22 AM         Assessment:  69 y.o.male with Raynaud's without gangrene of the bilateral feet    1. Pain and swelling of left lower leg    2. Raynaud's phenomenon without gangrene        Plan:   I will start him on amlodipine 5 mg daily.  His symptoms sound worse at night so he should take this before bedtime.  I would like to see him back in 1 month with no additional testing    No orders of the defined types were placed in this encounter.          Electronically signed by Arthur Delos Reyes, MD on 6/2/2025 at 10:47 AM     Copy sent to Barb Phillips APRN - JULIANA

## 2025-07-11 ENCOUNTER — HOSPITAL ENCOUNTER (OUTPATIENT)
Age: 70
Discharge: HOME | End: 2025-07-11
Payer: MEDICARE

## 2025-07-11 ENCOUNTER — TELEPHONE (OUTPATIENT)
Age: 70
End: 2025-07-11

## 2025-07-11 ENCOUNTER — OFFICE VISIT (OUTPATIENT)
Age: 70
End: 2025-07-11

## 2025-07-11 VITALS
OXYGEN SATURATION: 97 % | BODY MASS INDEX: 34.34 KG/M2 | WEIGHT: 289.6 LBS | SYSTOLIC BLOOD PRESSURE: 163 MMHG | DIASTOLIC BLOOD PRESSURE: 103 MMHG | RESPIRATION RATE: 16 BRPM | HEART RATE: 98 BPM | TEMPERATURE: 97 F

## 2025-07-11 DIAGNOSIS — C91.10 CHRONIC LYMPHOCYTIC LEUKEMIA (CLL) GENETIC MUTATION VARIANT (HCC): Primary | ICD-10-CM

## 2025-07-11 DIAGNOSIS — C91.10 CHRONIC LYMPHOCYTIC LEUKEMIA (CLL) GENETIC MUTATION VARIANT (HCC): ICD-10-CM

## 2025-07-11 DIAGNOSIS — C91.10 CLL (CHRONIC LYMPHOCYTIC LEUKEMIA) (HCC): ICD-10-CM

## 2025-07-11 LAB
ALBUMIN SERPL-MCNC: 4.6 G/DL (ref 3.5–5.2)
ALBUMIN/GLOB SERPL: 1.9 {RATIO} (ref 1–2.5)
ALP SERPL-CCNC: 117 U/L (ref 40–129)
ALT SERPL-CCNC: 17 U/L (ref 10–50)
ANION GAP SERPL CALCULATED.3IONS-SCNC: 10 MMOL/L (ref 9–16)
AST SERPL-CCNC: 23 U/L (ref 10–50)
ATYPICAL LYMPHOCYTE ABSOLUTE COUNT: 5.97 K/UL
ATYPICAL LYMPHOCYTES: 17 %
BASOPHILS # BLD: 0 K/UL (ref 0–0.2)
BASOPHILS NFR BLD: 0 % (ref 0–2)
BILIRUB SERPL-MCNC: 0.7 MG/DL (ref 0–1.2)
BUN SERPL-MCNC: 18 MG/DL (ref 8–23)
CALCIUM SERPL-MCNC: 8.8 MG/DL (ref 8.6–10.4)
CHLORIDE SERPL-SCNC: 97 MMOL/L (ref 98–107)
CO2 SERPL-SCNC: 25 MMOL/L (ref 20–31)
CREAT SERPL-MCNC: 1 MG/DL (ref 0.7–1.2)
EOSINOPHIL # BLD: 0 K/UL (ref 0–0.4)
EOSINOPHILS RELATIVE PERCENT: 0 % (ref 1–4)
ERYTHROCYTE [DISTWIDTH] IN BLOOD BY AUTOMATED COUNT: 15.4 % (ref 12.5–15.4)
GFR, ESTIMATED: 82 ML/MIN/1.73M2
GLUCOSE SERPL-MCNC: 117 MG/DL (ref 74–99)
HCT VFR BLD AUTO: 41.4 % (ref 41–53)
HGB BLD-MCNC: 13.5 G/DL (ref 13.5–17.5)
LDH SERPL-CCNC: 160 U/L (ref 135–225)
LYMPHOCYTES NFR BLD: 25.27 K/UL (ref 1–4.8)
LYMPHOCYTES RELATIVE PERCENT: 72 % (ref 24–44)
MCH RBC QN AUTO: 28.6 PG (ref 26–34)
MCHC RBC AUTO-ENTMCNC: 32.7 G/DL (ref 31–37)
MCV RBC AUTO: 87.3 FL (ref 80–100)
MONOCYTES NFR BLD: 1.4 K/UL (ref 0.1–0.8)
MONOCYTES NFR BLD: 4 % (ref 1–7)
MORPHOLOGY: ABNORMAL
NEUTROPHILS NFR BLD: 7 % (ref 36–66)
NEUTS SEG NFR BLD: 2.46 K/UL (ref 1.8–7.7)
PLATELET # BLD AUTO: 227 K/UL (ref 140–450)
PMV BLD AUTO: 7.2 FL (ref 6–12)
POTASSIUM SERPL-SCNC: 4.9 MMOL/L (ref 3.7–5.3)
PROT SERPL-MCNC: 7 G/DL (ref 6.6–8.7)
RBC # BLD AUTO: 4.74 M/UL (ref 4.5–5.9)
SODIUM SERPL-SCNC: 132 MMOL/L (ref 136–145)
WBC OTHER # BLD: 35.1 K/UL (ref 3.5–11)

## 2025-07-11 PROCEDURE — 36415 COLL VENOUS BLD VENIPUNCTURE: CPT

## 2025-07-11 PROCEDURE — 83615 LACTATE (LD) (LDH) ENZYME: CPT

## 2025-07-11 PROCEDURE — 80053 COMPREHEN METABOLIC PANEL: CPT

## 2025-07-11 PROCEDURE — 85025 COMPLETE CBC W/AUTO DIFF WBC: CPT

## 2025-07-11 NOTE — PROGRESS NOTES
_             An electronic signature was used to authenticate this note.  Chief Complaint   Patient presents with    Follow-up     Review status of disease    Discuss Labs     CBC,CMP,LDH       DIAGNOSIS:       Stage 0 CLL   leukocytosis with lymphocytosis on the blood test from 2014 secondary to CLL  Multiple comorbidities as listed    CURRENT THERAPY:         No indications to start treatment for CLL  BRIEF CASE HISTORY:      Mr. Tye Espinal is a very pleasant 69 y.o. male with history of multiple co morbidities as listed.  Patient is referred for evaluation of leukocytosis.  Patient had coronary artery disease history of acute MI.  History of chronic back problems.  He had routine labs twice yearly.  Most recent labs showed white blood cells of 18.8.  Differential was not done.  Hemoglobin and platelets were normal.  Patient has no symptoms related to this problem.  No history of fever or night sweats.  No weight loss or decreased appetite.  Actually he is gaining weight.  Patient denies feeling lumps or bumps or enlarged lymph nodes.  No repeated infections.  His main complaints is related to tiredness and fatigue.  Patient denies smoking or heavy alcohol drinking..     INTERIM HISTORY:   Patient seen for follow-up CLL.  Doing well clinically.  No evidence of fever or night sweats.  No weight loss or decreased appetite.  No enlarged lymph nodes.  No repeated infections.  No other complaints.       PAST MEDICAL HISTORY: has a past medical history of Arthritis, Chronic back pain, Chronic pain, Colon polyps, Degenerative disk disease, Failure of spinal fusion, Fractures, Hepatitis B, Hyperlipidemia, Hypertension, Irregular heart beat, Neuropathy, and Spinal stenosis.    PAST SURGICAL HISTORY: has a past surgical history that includes Tonsillectomy (1965); tendon manipulation (Left, 1966); Nerve Block (6-5-13); back surgery

## 2025-07-11 NOTE — TELEPHONE ENCOUNTER
Instructions   from Dr. Josse Palacios MD    RV 6 months with CBC, LDH at RV    RV scheduled 01/12/25

## 2025-07-14 ENCOUNTER — TELEPHONE (OUTPATIENT)
Dept: INFUSION THERAPY | Age: 70
End: 2025-07-14

## 2025-08-11 ENCOUNTER — TELEPHONE (OUTPATIENT)
Dept: VASCULAR SURGERY | Age: 70
End: 2025-08-11